# Patient Record
Sex: FEMALE | Race: WHITE | NOT HISPANIC OR LATINO | Employment: UNEMPLOYED | URBAN - METROPOLITAN AREA
[De-identification: names, ages, dates, MRNs, and addresses within clinical notes are randomized per-mention and may not be internally consistent; named-entity substitution may affect disease eponyms.]

---

## 2017-08-04 ENCOUNTER — HISTORICAL (OUTPATIENT)
Dept: INTERNAL MEDICINE | Facility: CLINIC | Age: 52
End: 2017-08-04

## 2017-08-15 ENCOUNTER — HISTORICAL (OUTPATIENT)
Dept: GASTROENTEROLOGY | Facility: CLINIC | Age: 52
End: 2017-08-15

## 2017-08-15 LAB
ALBUMIN SERPL-MCNC: 3.2 GM/DL (ref 3.4–5)
ALBUMIN/GLOB SERPL: 1 RATIO (ref 1–2)
ALP SERPL-CCNC: 162 UNIT/L (ref 45–117)
ALT SERPL-CCNC: 29 UNIT/L (ref 12–78)
AST SERPL-CCNC: 23 UNIT/L (ref 15–37)
BILIRUB SERPL-MCNC: 0.3 MG/DL (ref 0.2–1)
BILIRUBIN DIRECT+TOT PNL SERPL-MCNC: 0.1 MG/DL
BILIRUBIN DIRECT+TOT PNL SERPL-MCNC: 0.2 MG/DL
BUN SERPL-MCNC: 18 MG/DL (ref 7–18)
CALCIUM SERPL-MCNC: 9.7 MG/DL (ref 8.5–10.1)
CHLORIDE SERPL-SCNC: 102 MMOL/L (ref 98–107)
CO2 SERPL-SCNC: 26 MMOL/L (ref 21–32)
CREAT SERPL-MCNC: 0.7 MG/DL (ref 0.6–1.3)
ERYTHROCYTE [DISTWIDTH] IN BLOOD BY AUTOMATED COUNT: 12.4 % (ref 11.5–14.5)
GLOBULIN SER-MCNC: 4.9 GM/ML (ref 2.3–3.5)
GLUCOSE SERPL-MCNC: 279 MG/DL (ref 74–106)
HAV IGM SERPL QL IA: NONREACTIVE
HBV CORE IGM SERPL QL IA: NONREACTIVE
HBV SURFACE AG SERPL QL IA: NEGATIVE
HCT VFR BLD AUTO: 38.7 % (ref 35–46)
HCV AB SERPL QL IA: NONREACTIVE
HGB BLD-MCNC: 12.6 GM/DL (ref 12–16)
HIV 1+2 AB+HIV1 P24 AG SERPL QL IA: NONREACTIVE
INR PPP: 1.03 (ref 0.9–1.2)
MCH RBC QN AUTO: 27.5 PG (ref 26–34)
MCHC RBC AUTO-ENTMCNC: 32.6 GM/DL (ref 31–37)
MCV RBC AUTO: 84.3 FL (ref 80–100)
PLATELET # BLD AUTO: 162 X10(3)/MCL (ref 130–400)
PMV BLD AUTO: 13 FL (ref 7.4–10.4)
POTASSIUM SERPL-SCNC: 4.4 MMOL/L (ref 3.5–5.1)
PROT SERPL-MCNC: 8.1 GM/DL (ref 6.4–8.2)
PROTHROMBIN TIME: 13.3 SECOND(S) (ref 11.9–14.4)
RBC # BLD AUTO: 4.59 X10(6)/MCL (ref 4–5.2)
SODIUM SERPL-SCNC: 138 MMOL/L (ref 136–145)
T3FREE SERPL-MCNC: 10.28 PG/ML (ref 2.18–3.98)
T4 FREE SERPL-MCNC: 3.94 NG/DL (ref 0.76–1.46)
TSH SERPL-ACNC: <0.005 MIU/L (ref 0.36–3.74)
WBC # SPEC AUTO: 8.5 X10(3)/MCL (ref 4.5–11)

## 2017-10-09 ENCOUNTER — HISTORICAL (OUTPATIENT)
Dept: INTERNAL MEDICINE | Facility: CLINIC | Age: 52
End: 2017-10-09

## 2017-10-09 LAB
APPEARANCE, UA: CLEAR
BACTERIA #/AREA URNS AUTO: ABNORMAL /[HPF]
BILIRUB UR QL STRIP: NEGATIVE
CHOLEST SERPL-MCNC: 196 MG/DL
CHOLEST/HDLC SERPL: 3.3 {RATIO} (ref 0–4.4)
COLOR UR: YELLOW
CREAT UR-MCNC: 176 MG/DL
DEPRECATED CALCIDIOL+CALCIFEROL SERPL-MC: 28.49 NG/ML (ref 30–80)
EST. AVERAGE GLUCOSE BLD GHB EST-MCNC: 212 MG/DL
GLUCOSE (UA): 50 MG/DL
HBA1C MFR BLD: 9 % (ref 4.2–6.3)
HDLC SERPL-MCNC: 60 MG/DL
HGB UR QL STRIP: 0.03 MG/DL
HYALINE CASTS #/AREA URNS LPF: ABNORMAL /[LPF]
KETONES UR QL STRIP: NEGATIVE
LDLC SERPL CALC-MCNC: 102 MG/DL (ref 0–130)
LEUKOCYTE ESTERASE UR QL STRIP: NEGATIVE
NITRITE UR QL STRIP: NEGATIVE
PH UR STRIP: 5.5 [PH] (ref 4.5–8)
PROT UR QL STRIP: 20 MG/DL
PROT UR STRIP-MCNC: 33.3 MG/DL
PROT/CREAT UR-RTO: 189.2 MG/GM
RBC #/AREA URNS AUTO: ABNORMAL /[HPF]
SP GR UR STRIP: 1.03 (ref 1–1.03)
SQUAMOUS #/AREA URNS LPF: ABNORMAL /[LPF]
TRIGL SERPL-MCNC: 168 MG/DL
UROBILINOGEN UR STRIP-ACNC: NORMAL
VLDLC SERPL CALC-MCNC: 34 MG/DL
WBC #/AREA URNS AUTO: ABNORMAL /HPF

## 2017-11-06 ENCOUNTER — HISTORICAL (OUTPATIENT)
Dept: INTERNAL MEDICINE | Facility: CLINIC | Age: 52
End: 2017-11-06

## 2017-11-06 LAB
ABS NEUT (OLG): 5.17 X10(3)/MCL (ref 2.1–9.2)
ALBUMIN SERPL-MCNC: 3.7 GM/DL (ref 3.4–5)
ALBUMIN/GLOB SERPL: 1 RATIO (ref 1–2)
ALP SERPL-CCNC: 117 UNIT/L (ref 45–117)
ALT SERPL-CCNC: 35 UNIT/L (ref 12–78)
AST SERPL-CCNC: 21 UNIT/L (ref 15–37)
BASOPHILS # BLD AUTO: 0.04 X10(3)/MCL
BASOPHILS NFR BLD AUTO: 0 % (ref 0–1)
BILIRUB SERPL-MCNC: 0.4 MG/DL (ref 0.2–1)
BILIRUBIN DIRECT+TOT PNL SERPL-MCNC: 0.1 MG/DL
BILIRUBIN DIRECT+TOT PNL SERPL-MCNC: 0.3 MG/DL
BUN SERPL-MCNC: 18 MG/DL (ref 7–18)
CALCIUM SERPL-MCNC: 9.3 MG/DL (ref 8.5–10.1)
CHLORIDE SERPL-SCNC: 105 MMOL/L (ref 98–107)
CO2 SERPL-SCNC: 29 MMOL/L (ref 21–32)
CREAT SERPL-MCNC: 0.8 MG/DL (ref 0.6–1.3)
EOSINOPHIL # BLD AUTO: 0.31 X10(3)/MCL
EOSINOPHIL NFR BLD AUTO: 4 % (ref 0–5)
ERYTHROCYTE [DISTWIDTH] IN BLOOD BY AUTOMATED COUNT: 15 % (ref 11.5–14.5)
EST. AVERAGE GLUCOSE BLD GHB EST-MCNC: 166 MG/DL
GLOBULIN SER-MCNC: 4.3 GM/ML (ref 2.3–3.5)
GLUCOSE SERPL-MCNC: 112 MG/DL (ref 74–106)
HBA1C MFR BLD: 7.4 % (ref 4.2–6.3)
HCT VFR BLD AUTO: 44 % (ref 35–46)
HGB BLD-MCNC: 14.4 GM/DL (ref 12–16)
IMM GRANULOCYTES # BLD AUTO: 0.03 10*3/UL
IMM GRANULOCYTES NFR BLD AUTO: 0 %
LYMPHOCYTES # BLD AUTO: 2.22 X10(3)/MCL
LYMPHOCYTES NFR BLD AUTO: 27 % (ref 15–40)
MCH RBC QN AUTO: 29 PG (ref 26–34)
MCHC RBC AUTO-ENTMCNC: 32.7 GM/DL (ref 31–37)
MCV RBC AUTO: 88.5 FL (ref 80–100)
MONOCYTES # BLD AUTO: 0.39 X10(3)/MCL
MONOCYTES NFR BLD AUTO: 5 % (ref 4–12)
NEUTROPHILS # BLD AUTO: 5.17 X10(3)/MCL
NEUTROPHILS NFR BLD AUTO: 63 X10(3)/MCL
PLATELET # BLD AUTO: 150 X10(3)/MCL (ref 130–400)
PMV BLD AUTO: 10.2 FL (ref 7.4–10.4)
POTASSIUM SERPL-SCNC: 5 MMOL/L (ref 3.5–5.1)
PROT SERPL-MCNC: 8 GM/DL (ref 6.4–8.2)
RBC # BLD AUTO: 4.97 X10(6)/MCL (ref 4–5.2)
SODIUM SERPL-SCNC: 139 MMOL/L (ref 136–145)
WBC # SPEC AUTO: 8.2 X10(3)/MCL (ref 4.5–11)

## 2018-05-30 ENCOUNTER — HISTORICAL (OUTPATIENT)
Dept: ADMINISTRATIVE | Facility: HOSPITAL | Age: 53
End: 2018-05-30

## 2018-05-30 LAB
ABS NEUT (OLG): 6.89 X10(3)/MCL (ref 2.1–9.2)
ALBUMIN SERPL-MCNC: 3.9 GM/DL (ref 3.4–5)
ALBUMIN/GLOB SERPL: 1 RATIO (ref 1–2)
ALP SERPL-CCNC: 94 UNIT/L (ref 45–117)
ALT SERPL-CCNC: 26 UNIT/L (ref 12–78)
AST SERPL-CCNC: 18 UNIT/L (ref 15–37)
BASOPHILS # BLD AUTO: 0.06 X10(3)/MCL
BASOPHILS NFR BLD AUTO: 1 %
BILIRUB SERPL-MCNC: 0.6 MG/DL (ref 0.2–1)
BILIRUBIN DIRECT+TOT PNL SERPL-MCNC: 0.2 MG/DL
BILIRUBIN DIRECT+TOT PNL SERPL-MCNC: 0.4 MG/DL
BUN SERPL-MCNC: 13 MG/DL (ref 7–18)
CALCIUM SERPL-MCNC: 9.2 MG/DL (ref 8.5–10.1)
CHLORIDE SERPL-SCNC: 109 MMOL/L (ref 98–107)
CO2 SERPL-SCNC: 24 MMOL/L (ref 21–32)
CREAT SERPL-MCNC: 0.8 MG/DL (ref 0.6–1.3)
EOSINOPHIL # BLD AUTO: 0.28 10*3/UL
EOSINOPHIL NFR BLD AUTO: 3 %
ERYTHROCYTE [DISTWIDTH] IN BLOOD BY AUTOMATED COUNT: 13.1 % (ref 11.5–14.5)
EST. AVERAGE GLUCOSE BLD GHB EST-MCNC: 174 MG/DL
GLOBULIN SER-MCNC: 4.1 GM/ML (ref 2.3–3.5)
GLUCOSE SERPL-MCNC: 109 MG/DL (ref 74–106)
HBA1C MFR BLD: 7.7 % (ref 4.2–6.3)
HCT VFR BLD AUTO: 44.8 % (ref 35–46)
HGB BLD-MCNC: 14.8 GM/DL (ref 12–16)
IMM GRANULOCYTES # BLD AUTO: 0.02 10*3/UL
IMM GRANULOCYTES NFR BLD AUTO: 0 %
LYMPHOCYTES # BLD AUTO: 2.72 X10(3)/MCL
LYMPHOCYTES NFR BLD AUTO: 26 % (ref 13–40)
MCH RBC QN AUTO: 30.5 PG (ref 26–34)
MCHC RBC AUTO-ENTMCNC: 33 GM/DL (ref 31–37)
MCV RBC AUTO: 92.4 FL (ref 80–100)
MONOCYTES # BLD AUTO: 0.46 X10(3)/MCL
MONOCYTES NFR BLD AUTO: 4 % (ref 4–12)
NEUTROPHILS # BLD AUTO: 6.89 X10(3)/MCL
NEUTROPHILS NFR BLD AUTO: 66 X10(3)/MCL
PLATELET # BLD AUTO: 230 X10(3)/MCL (ref 130–400)
PMV BLD AUTO: 10.3 FL (ref 7.4–10.4)
POTASSIUM SERPL-SCNC: 4 MMOL/L (ref 3.5–5.1)
PROT SERPL-MCNC: 8 GM/DL (ref 6.4–8.2)
RBC # BLD AUTO: 4.85 X10(6)/MCL (ref 4–5.2)
SODIUM SERPL-SCNC: 142 MMOL/L (ref 136–145)
T3FREE SERPL-MCNC: 3.06 PG/ML (ref 2.18–3.98)
T4 FREE SERPL-MCNC: 1.07 NG/DL (ref 0.76–1.46)
TSH SERPL-ACNC: 1.11 MIU/L (ref 0.36–3.74)
WBC # SPEC AUTO: 10.4 X10(3)/MCL (ref 4.5–11)

## 2019-01-22 ENCOUNTER — HISTORICAL (OUTPATIENT)
Dept: GASTROENTEROLOGY | Facility: CLINIC | Age: 54
End: 2019-01-22

## 2019-01-22 LAB
ABS NEUT (OLG): 4.75 X10(3)/MCL (ref 2.1–9.2)
ALBUMIN SERPL-MCNC: 3.7 GM/DL (ref 3.4–5)
ALBUMIN/GLOB SERPL: 0.86 RATIO (ref 1.1–2)
ALP SERPL-CCNC: 79 UNIT/L (ref 45–117)
ALT SERPL-CCNC: 25 UNIT/L (ref 12–78)
AST SERPL-CCNC: 14 UNIT/L (ref 15–37)
BASOPHILS # BLD AUTO: 0.06 X10(3)/MCL
BASOPHILS NFR BLD AUTO: 1 %
BILIRUB SERPL-MCNC: 0.5 MG/DL (ref 0.2–1)
BILIRUBIN DIRECT+TOT PNL SERPL-MCNC: 0.1 MG/DL
BILIRUBIN DIRECT+TOT PNL SERPL-MCNC: 0.4 MG/DL
BUN SERPL-MCNC: 16 MG/DL (ref 7–18)
CALCIUM SERPL-MCNC: 8.5 MG/DL (ref 8.5–10.1)
CHLORIDE SERPL-SCNC: 105 MMOL/L (ref 98–107)
CO2 SERPL-SCNC: 25 MMOL/L (ref 21–32)
CREAT SERPL-MCNC: 0.8 MG/DL (ref 0.6–1.3)
CRP SERPL-MCNC: 1 MG/DL
EOSINOPHIL # BLD AUTO: 0.15 10*3/UL
EOSINOPHIL NFR BLD AUTO: 2 %
ERYTHROCYTE [DISTWIDTH] IN BLOOD BY AUTOMATED COUNT: 12.2 % (ref 11.5–14.5)
FERRITIN SERPL-MCNC: 33.1 NG/ML (ref 8–388)
FOLATE SERPL-MCNC: 18.4 NG/ML (ref 3.1–17.5)
GLOBULIN SER-MCNC: 4.3 GM/ML (ref 2.3–3.5)
GLUCOSE SERPL-MCNC: 132 MG/DL (ref 74–106)
HAV AB SER QL IA: NONREACTIVE
HBV SURFACE AG SERPL QL IA: NEGATIVE
HCT VFR BLD AUTO: 41.2 % (ref 35–46)
HGB BLD-MCNC: 13.4 GM/DL (ref 12–16)
IMM GRANULOCYTES # BLD AUTO: 0.02 10*3/UL
IMM GRANULOCYTES NFR BLD AUTO: 0 %
IRON SATN MFR SERPL: 13.3 % (ref 15–50)
IRON SERPL-MCNC: 58 MCG/DL (ref 50–170)
LYMPHOCYTES # BLD AUTO: 2.28 X10(3)/MCL
LYMPHOCYTES NFR BLD AUTO: 30 % (ref 13–40)
MCH RBC QN AUTO: 29.8 PG (ref 26–34)
MCHC RBC AUTO-ENTMCNC: 32.5 GM/DL (ref 31–37)
MCV RBC AUTO: 91.8 FL (ref 80–100)
MONOCYTES # BLD AUTO: 0.32 X10(3)/MCL
MONOCYTES NFR BLD AUTO: 4 % (ref 4–12)
NEUTROPHILS # BLD AUTO: 4.75 X10(3)/MCL
NEUTROPHILS NFR BLD AUTO: 63 X10(3)/MCL
PLATELET # BLD AUTO: 202 X10(3)/MCL (ref 130–400)
PMV BLD AUTO: 10.2 FL (ref 7.4–10.4)
POTASSIUM SERPL-SCNC: 3.8 MMOL/L (ref 3.5–5.1)
PROT SERPL-MCNC: 8 GM/DL (ref 6.4–8.2)
RBC # BLD AUTO: 4.49 X10(6)/MCL (ref 4–5.2)
SODIUM SERPL-SCNC: 137 MMOL/L (ref 136–145)
TIBC SERPL-MCNC: 435 MCG/DL (ref 250–450)
TRANSFERRIN SERPL-MCNC: 350 MG/DL (ref 200–360)
VIT B12 SERPL-MCNC: 392 PG/ML (ref 193–986)
WBC # SPEC AUTO: 7.6 X10(3)/MCL (ref 4.5–11)

## 2019-02-13 ENCOUNTER — HISTORICAL (OUTPATIENT)
Dept: RADIOLOGY | Facility: HOSPITAL | Age: 54
End: 2019-02-13

## 2019-02-18 ENCOUNTER — HISTORICAL (OUTPATIENT)
Dept: GASTROENTEROLOGY | Facility: CLINIC | Age: 54
End: 2019-02-18

## 2019-02-20 ENCOUNTER — HISTORICAL (OUTPATIENT)
Dept: ENDOSCOPY | Facility: HOSPITAL | Age: 54
End: 2019-02-20

## 2019-09-24 ENCOUNTER — HISTORICAL (OUTPATIENT)
Dept: RADIOLOGY | Facility: HOSPITAL | Age: 54
End: 2019-09-24

## 2019-10-29 ENCOUNTER — HISTORICAL (OUTPATIENT)
Dept: ADMINISTRATIVE | Facility: HOSPITAL | Age: 54
End: 2019-10-29

## 2019-10-29 LAB
ABS NEUT (OLG): 6.58 X10(3)/MCL (ref 2.1–9.2)
ALBUMIN SERPL-MCNC: 3.7 GM/DL (ref 3.4–5)
ALBUMIN/GLOB SERPL: 0.9 RATIO (ref 1.1–2)
ALP SERPL-CCNC: 75 UNIT/L (ref 45–117)
ALT SERPL-CCNC: 26 UNIT/L (ref 12–78)
AST SERPL-CCNC: 18 UNIT/L (ref 15–37)
BASOPHILS # BLD AUTO: 0 X10(3)/MCL (ref 0–0.2)
BASOPHILS NFR BLD AUTO: 0 %
BILIRUB SERPL-MCNC: 0.5 MG/DL (ref 0.2–1)
BILIRUBIN DIRECT+TOT PNL SERPL-MCNC: 0.1 MG/DL (ref 0–0.2)
BILIRUBIN DIRECT+TOT PNL SERPL-MCNC: 0.4 MG/DL
BUN SERPL-MCNC: 21 MG/DL (ref 7–18)
CALCIUM SERPL-MCNC: 8.8 MG/DL (ref 8.5–10.1)
CHLORIDE SERPL-SCNC: 108 MMOL/L (ref 98–107)
CHOLEST SERPL-MCNC: 233 MG/DL
CHOLEST/HDLC SERPL: 4.6 {RATIO} (ref 0–4.4)
CO2 SERPL-SCNC: 24 MMOL/L (ref 21–32)
CREAT SERPL-MCNC: 0.8 MG/DL (ref 0.6–1.3)
CREAT UR-MCNC: 228 MG/DL
CRP SERPL-MCNC: 1.3 MG/DL
DEPRECATED CALCIDIOL+CALCIFEROL SERPL-MC: 17.92 NG/ML (ref 30–80)
EOSINOPHIL # BLD AUTO: 0.3 X10(3)/MCL (ref 0–0.9)
EOSINOPHIL NFR BLD AUTO: 3 %
ERYTHROCYTE [DISTWIDTH] IN BLOOD BY AUTOMATED COUNT: 13.5 % (ref 11.5–14.5)
EST. AVERAGE GLUCOSE BLD GHB EST-MCNC: 174 MG/DL
FERRITIN SERPL-MCNC: 20.8 NG/ML (ref 8–388)
GLOBULIN SER-MCNC: 4 GM/ML (ref 2.3–3.5)
GLUCOSE SERPL-MCNC: 148 MG/DL (ref 74–106)
HBA1C MFR BLD: 7.7 % (ref 4.2–6.3)
HCT VFR BLD AUTO: 44.5 % (ref 35–46)
HDLC SERPL-MCNC: 51 MG/DL (ref 40–59)
HGB BLD-MCNC: 14.2 GM/DL (ref 12–16)
IMM GRANULOCYTES # BLD AUTO: 0.05 10*3/UL
IMM GRANULOCYTES NFR BLD AUTO: 0 %
LDLC SERPL CALC-MCNC: 111 MG/DL
LYMPHOCYTES # BLD AUTO: 2.4 X10(3)/MCL (ref 0.6–4.6)
LYMPHOCYTES NFR BLD AUTO: 24 %
MCH RBC QN AUTO: 30.5 PG (ref 26–34)
MCHC RBC AUTO-ENTMCNC: 31.9 GM/DL (ref 31–37)
MCV RBC AUTO: 95.5 FL (ref 80–100)
MONOCYTES # BLD AUTO: 0.4 X10(3)/MCL (ref 0.1–1.3)
MONOCYTES NFR BLD AUTO: 4 %
NEG CONT SPOT COUNT: NORMAL
NEUTROPHILS # BLD AUTO: 6.58 X10(3)/MCL (ref 2.1–9.2)
NEUTROPHILS NFR BLD AUTO: 67 %
PANEL A SPOT COUNT: 0
PANEL B SPOT COUNT: 0
PLATELET # BLD AUTO: 264 X10(3)/MCL (ref 130–400)
PMV BLD AUTO: 9.9 FL (ref 7.4–10.4)
POS CONT SPOT COUNT: NORMAL
POTASSIUM SERPL-SCNC: 4.1 MMOL/L (ref 3.5–5.1)
PROT SERPL-MCNC: 7.7 GM/DL (ref 6.4–8.2)
PROT UR STRIP-MCNC: 28.7 MG/DL
PROT/CREAT UR-RTO: 125.9 MG/GM
RBC # BLD AUTO: 4.66 X10(6)/MCL (ref 4–5.2)
SODIUM SERPL-SCNC: 139 MMOL/L (ref 136–145)
T-SPOT.TB: NORMAL
T4 FREE SERPL-MCNC: 0.97 NG/DL (ref 0.76–1.46)
TRIGL SERPL-MCNC: 354 MG/DL
TSH SERPL-ACNC: 1.26 MIU/L (ref 0.36–3.74)
VIT B12 SERPL-MCNC: 378 PG/ML (ref 193–986)
VLDLC SERPL CALC-MCNC: 71 MG/DL
WBC # SPEC AUTO: 9.8 X10(3)/MCL (ref 4.5–11)

## 2021-03-19 ENCOUNTER — HISTORICAL (OUTPATIENT)
Dept: INTERNAL MEDICINE | Facility: CLINIC | Age: 56
End: 2021-03-19

## 2021-03-19 LAB
ABS NEUT (OLG): 9.01 X10(3)/MCL (ref 2.1–9.2)
ALBUMIN SERPL-MCNC: 3.9 GM/DL (ref 3.5–5)
ALBUMIN/GLOB SERPL: 1.1 RATIO (ref 1.1–2)
ALP SERPL-CCNC: 81 UNIT/L (ref 40–150)
ALT SERPL-CCNC: 11 UNIT/L (ref 0–55)
APPEARANCE, UA: CLEAR
AST SERPL-CCNC: 14 UNIT/L (ref 5–34)
BACTERIA #/AREA URNS AUTO: ABNORMAL /HPF
BASOPHILS # BLD AUTO: 0.1 X10(3)/MCL (ref 0–0.2)
BASOPHILS NFR BLD AUTO: 0 %
BILIRUB SERPL-MCNC: 0.5 MG/DL
BILIRUB UR QL STRIP: NEGATIVE
BILIRUBIN DIRECT+TOT PNL SERPL-MCNC: 0.2 MG/DL (ref 0–0.5)
BILIRUBIN DIRECT+TOT PNL SERPL-MCNC: 0.3 MG/DL (ref 0–0.8)
BUN SERPL-MCNC: 17.8 MG/DL (ref 9.8–20.1)
CALCIUM SERPL-MCNC: 9 MG/DL (ref 8.4–10.2)
CHLORIDE SERPL-SCNC: 105 MMOL/L (ref 98–107)
CHOLEST SERPL-MCNC: 194 MG/DL
CHOLEST/HDLC SERPL: 4 {RATIO} (ref 0–5)
CO2 SERPL-SCNC: 25 MMOL/L (ref 22–29)
COLOR UR: YELLOW
CREAT SERPL-MCNC: 0.82 MG/DL (ref 0.55–1.02)
CREAT UR-MCNC: 195.2 MG/DL (ref 45–106)
DEPRECATED CALCIDIOL+CALCIFEROL SERPL-MC: 23.8 NG/ML (ref 30–80)
EOSINOPHIL # BLD AUTO: 0.2 X10(3)/MCL (ref 0–0.9)
EOSINOPHIL NFR BLD AUTO: 2 %
ERYTHROCYTE [DISTWIDTH] IN BLOOD BY AUTOMATED COUNT: 13.6 % (ref 11.5–14.5)
EST. AVERAGE GLUCOSE BLD GHB EST-MCNC: 228.8 MG/DL
GLOBULIN SER-MCNC: 3.5 GM/DL (ref 2.4–3.5)
GLUCOSE (UA): 50 MG/DL
GLUCOSE SERPL-MCNC: 216 MG/DL (ref 74–100)
HBA1C MFR BLD: 9.6 %
HCT VFR BLD AUTO: 41.1 % (ref 35–46)
HDLC SERPL-MCNC: 52 MG/DL (ref 35–60)
HGB BLD-MCNC: 12.7 GM/DL (ref 12–16)
HGB UR QL STRIP: NEGATIVE
HYALINE CASTS #/AREA URNS LPF: ABNORMAL /LPF
IMM GRANULOCYTES # BLD AUTO: 0.04 10*3/UL
IMM GRANULOCYTES NFR BLD AUTO: 0 %
KETONES UR QL STRIP: ABNORMAL
LDLC SERPL CALC-MCNC: 107 MG/DL (ref 50–140)
LEUKOCYTE ESTERASE UR QL STRIP: NEGATIVE
LYMPHOCYTES # BLD AUTO: 2.1 X10(3)/MCL (ref 0.6–4.6)
LYMPHOCYTES NFR BLD AUTO: 18 %
MCH RBC QN AUTO: 28.6 PG (ref 26–34)
MCHC RBC AUTO-ENTMCNC: 30.9 GM/DL (ref 31–37)
MCV RBC AUTO: 92.6 FL (ref 80–100)
MICROALBUMIN UR-MCNC: 12.9 MG/L
MICROALBUMIN/CREAT RATIO PNL UR: 6.6 MG/GM CR (ref 0–30)
MONOCYTES # BLD AUTO: 0.5 X10(3)/MCL (ref 0.1–1.3)
MONOCYTES NFR BLD AUTO: 4 %
NEUTROPHILS # BLD AUTO: 9.01 X10(3)/MCL (ref 2.1–9.2)
NEUTROPHILS NFR BLD AUTO: 76 %
NITRITE UR QL STRIP: NEGATIVE
PH UR STRIP: 5.5 [PH] (ref 4.5–8)
PLATELET # BLD AUTO: 346 X10(3)/MCL (ref 130–400)
PMV BLD AUTO: 9.7 FL (ref 7.4–10.4)
POTASSIUM SERPL-SCNC: 4.8 MMOL/L (ref 3.5–5.1)
PROT SERPL-MCNC: 7.4 GM/DL (ref 6.4–8.3)
PROT UR QL STRIP: 20 MG/DL
RBC # BLD AUTO: 4.44 X10(6)/MCL (ref 4–5.2)
RBC #/AREA URNS AUTO: ABNORMAL /HPF
SODIUM SERPL-SCNC: 139 MMOL/L (ref 136–145)
SP GR UR STRIP: 1.03 (ref 1–1.03)
SQUAMOUS #/AREA URNS LPF: >100 /LPF
T4 FREE SERPL-MCNC: 0.96 NG/DL (ref 0.7–1.48)
TRIGL SERPL-MCNC: 174 MG/DL (ref 37–140)
TSH SERPL-ACNC: 1.49 UIU/ML (ref 0.35–4.94)
UROBILINOGEN UR STRIP-ACNC: 2 MG/DL
VLDLC SERPL CALC-MCNC: 35 MG/DL
WBC # SPEC AUTO: 11.9 X10(3)/MCL (ref 4.5–11)
WBC #/AREA URNS AUTO: ABNORMAL /HPF

## 2021-08-07 ENCOUNTER — HISTORICAL (OUTPATIENT)
Dept: ADMINISTRATIVE | Facility: HOSPITAL | Age: 56
End: 2021-08-07

## 2021-08-07 LAB — SARS-COV-2 RNA RESP QL NAA+PROBE: NOT DETECTED

## 2021-10-27 ENCOUNTER — HISTORICAL (OUTPATIENT)
Dept: GASTROENTEROLOGY | Facility: CLINIC | Age: 56
End: 2021-10-27

## 2021-10-27 LAB — SARS-COV-2 AG RESP QL IA.RAPID: NEGATIVE

## 2021-10-29 ENCOUNTER — HISTORICAL (OUTPATIENT)
Dept: ENDOSCOPY | Facility: HOSPITAL | Age: 56
End: 2021-10-29

## 2021-11-22 ENCOUNTER — HISTORICAL (OUTPATIENT)
Dept: ADMINISTRATIVE | Facility: HOSPITAL | Age: 56
End: 2021-11-22

## 2021-11-22 LAB
ABS NEUT (OLG): 9.56 X10(3)/MCL (ref 2.1–9.2)
ALBUMIN SERPL-MCNC: 4 GM/DL (ref 3.5–5)
ALBUMIN/GLOB SERPL: 1.1 RATIO (ref 1.1–2)
ALP SERPL-CCNC: 65 UNIT/L (ref 40–150)
ALT SERPL-CCNC: 17 UNIT/L (ref 0–55)
AST SERPL-CCNC: 18 UNIT/L (ref 5–34)
BASOPHILS # BLD AUTO: 0.1 X10(3)/MCL (ref 0–0.2)
BASOPHILS NFR BLD AUTO: 0 %
BILIRUB SERPL-MCNC: 0.6 MG/DL
BILIRUBIN DIRECT+TOT PNL SERPL-MCNC: 0.2 MG/DL (ref 0–0.5)
BILIRUBIN DIRECT+TOT PNL SERPL-MCNC: 0.4 MG/DL (ref 0–0.8)
BUN SERPL-MCNC: 18.6 MG/DL (ref 9.8–20.1)
CALCIUM SERPL-MCNC: 9.4 MG/DL (ref 8.7–10.5)
CHLORIDE SERPL-SCNC: 108 MMOL/L (ref 98–107)
CO2 SERPL-SCNC: 22 MMOL/L (ref 22–29)
CREAT SERPL-MCNC: 0.9 MG/DL (ref 0.55–1.02)
EOSINOPHIL # BLD AUTO: 0.2 X10(3)/MCL (ref 0–0.9)
EOSINOPHIL NFR BLD AUTO: 2 %
ERYTHROCYTE [DISTWIDTH] IN BLOOD BY AUTOMATED COUNT: 13.3 % (ref 11.5–14.5)
EST. AVERAGE GLUCOSE BLD GHB EST-MCNC: 188.6 MG/DL
GLOBULIN SER-MCNC: 3.5 GM/DL (ref 2.4–3.5)
GLUCOSE SERPL-MCNC: 191 MG/DL (ref 74–100)
HBA1C MFR BLD: 8.2 %
HCT VFR BLD AUTO: 43.6 % (ref 35–46)
HGB BLD-MCNC: 14 GM/DL (ref 12–16)
IMM GRANULOCYTES # BLD AUTO: 0.05 10*3/UL
IMM GRANULOCYTES NFR BLD AUTO: 0 %
LYMPHOCYTES # BLD AUTO: 2.7 X10(3)/MCL (ref 0.6–4.6)
LYMPHOCYTES NFR BLD AUTO: 21 %
MCH RBC QN AUTO: 29.8 PG (ref 26–34)
MCHC RBC AUTO-ENTMCNC: 32.1 GM/DL (ref 31–37)
MCV RBC AUTO: 92.8 FL (ref 80–100)
MONOCYTES # BLD AUTO: 0.6 X10(3)/MCL (ref 0.1–1.3)
MONOCYTES NFR BLD AUTO: 4 %
NEUTROPHILS # BLD AUTO: 9.56 X10(3)/MCL (ref 2.1–9.2)
NEUTROPHILS NFR BLD AUTO: 72 %
NRBC BLD AUTO-RTO: 0 % (ref 0–0.2)
PLATELET # BLD AUTO: 312 X10(3)/MCL (ref 130–400)
PMV BLD AUTO: 10.5 FL (ref 7.4–10.4)
POTASSIUM SERPL-SCNC: 4.3 MMOL/L (ref 3.5–5.1)
PROT SERPL-MCNC: 7.5 GM/DL (ref 6.4–8.3)
RBC # BLD AUTO: 4.7 X10(6)/MCL (ref 4–5.2)
SODIUM SERPL-SCNC: 139 MMOL/L (ref 136–145)
VIT B12 SERPL-MCNC: 375 PG/ML (ref 213–816)
WBC # SPEC AUTO: 13.2 X10(3)/MCL (ref 4.5–11)

## 2022-04-12 ENCOUNTER — HISTORICAL (OUTPATIENT)
Dept: ADMINISTRATIVE | Facility: HOSPITAL | Age: 57
End: 2022-04-12

## 2022-04-30 VITALS
HEIGHT: 64 IN | OXYGEN SATURATION: 100 % | BODY MASS INDEX: 29.4 KG/M2 | SYSTOLIC BLOOD PRESSURE: 130 MMHG | WEIGHT: 172.19 LBS | DIASTOLIC BLOOD PRESSURE: 84 MMHG

## 2022-05-05 DIAGNOSIS — K50.90 CROHN'S DISEASE WITHOUT COMPLICATION, UNSPECIFIED GASTROINTESTINAL TRACT LOCATION: Primary | ICD-10-CM

## 2022-05-05 NOTE — TELEPHONE ENCOUNTER
----- Message from Caridad Buchanan sent at 5/5/2022  2:40 PM CDT -----  Pt states that she is still waiting on PA so that she can get Humira from pharmacy. Please contact pt [phone]

## 2022-05-05 NOTE — HISTORICAL [facility] CERNER
This is a historical note converted from Amy. Formatting and pictures may have been removed.  Please reference [facility] for original formatting and attached multimedia. History of Present Illness  Ms. 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  ?   She was diagnosed with Crohns disease around 1996 when she presented with nausea, vomiting, abdominal pain. ?She had a surgical procedure to remove an infection. ?She recalls being placed on steroids and at one point Asacol without significant improvement. ?She also remembers being briefly on Remicade at one point. ?She moved to [place] and was seen a gastroenterologist there. ?I do not have any records of what transpired during her time in [place]. ?She moved back went several years without seeing a gastroenterologist due to insurance reasons.  ?  In November 2014 she was seen in GI clinic here. ?Prior to that she had presented to the emergency department several times with abdominal pain. ?CT scan in 2014 described severe mural thickening and mesenteric fat stranding in the right lower quadrant involving the terminal ileum, cecum, descending colon and to a lesser degree the transverse colon. ?There was evidence of a colovesicular fistula, enteroenteric fistula between the sigmoid and cecum and between the ileum and cecum. ?There is also evidence of an enterocutaneous fistula. ?She was initially given steroids and sulfasalazine. ?On January 19, 2015, she had a colonoscopy that showed an inflammatory mass around the area of the ileocecal valve and cecum, possible fistula in the right side of the colon, normal ascending, transverse and descending colon, sigmoid diverticulosis along with small polyps not resected in the sigmoid. ?Pathology report of the right colon mass showed severe chronic inflammation and biopsies of the sigmoid colon polyp showed mild  chronic inflammation.  ?  She was started on Humira 40 mg every other week for maintenance. ?Overall continued to suffer from drainage from enterocutaneous fistulas. ?On October 14, 2016, she underwent an expiratory laparotomy, small bowel resection with ileocecectomy, ileocolonic anastomosis, and takedown of an enterosigmoid fistula with primary repair of sigmoid defect. ?Pathology from this resection showed severely adherent intestinal severe transmural inflammation, fibrosis, and fistulas along with chronic inflammation, mucosal ulcerations and fibrosis consistent with Crohns disease.  ?  Following resection she was started back on Humira 40 mg every other week.? Shes had chronic diarrhea?since resection.?  ?   February 2019 colonoscopy showed Rutgeerts i2a disease with ulcerations on IC anastomosis and one aphthous ulcer in the neoti.?  Feb 2019: fecal calprotectin: <16  Feb 2019 ADA trough level: 1.9, Ab < 25  ?   Humira was increased to 40 mg weekly and mtx/folic acid added.?I recommend cholestyramine for diarrhea but she never tried this. She did not remain compliant with methotrexate.  ?   October 2019: ADA trough: 10, Ab < 25 on Humira 40mg?weekly.  ?  She reports compliance with her Humira?on a weekly basis. ?She has never taken the methotrexate and folic acid.? She takes Humira every Thursday. ?Her last dose of Humira was 7 days ago. ?She continues to have diarrhea, sometimes up to 20 bowel movements a day she says.? She denies any nocturnal stools. ?She denies any rectal bleeding or melena. ?She denies any significant abdominal pain.? Appetite is good and her weight is stable. ?She continues to smoke.? She never tried cholestyramine for the diarrhea.  ?  She denies a family history of?inflammatory bowel?disease, colon polyps, or colon cancer.? She is a former smoker?but has not smoked for several years. ?She denies any significant alcohol or recreational drugs.  ?  ?   Endoscopy:  January 19, 2015:  Colonoscopy: inflammatory mass around the area of the ileocecal valve and cecum, possible fistula in the right side of the colon, normal ascending, transverse and descending colon, sigmoid diverticulosis along with small polyps not resected in the sigmoid. ?Pathology report of the right colon mass showed severe chronic inflammation and biopsies of the sigmoid colon polyp showed mild chronic inflammation.  ?   February 20, 2019: Colonoscopy: skin tags, mild puckering of the perianal skin.? Evidence of a prior ileocecectomy?with patent anastomosis.? Superficial ulcerations involving the anastomosis only except for one 3?mm ulcer?in the neoterminal ileum. ?The rest of the neoterminal ileum appeared normal. Approximately 15 cm of the neoterminal ileum was intubated.??Biopsies were taken.? Rutgeerts score i2a.? The remaining colon was normal except for scattered diverticuli in the sigmoid colon. ?She did have an area in the sigmoid?with some scarring and 2 pseudopolyps. Gerard terminal ileum biopsy: mild chronic active inflammation. No significant glandular distortion, cryptitis, granulomas, or fibrosis.  ?   IBD History:  IBD disease: Crohns disease  Disease location: Ileocolonic  Disease behavior: stricturing, penetrating  ?  Current therapy:?  Adalimumab 40mg weekly (February 2019 - present, trough 10, Ab < 25)  ?  Prior therapy:  Steroids  Sulfasalazine  Asacol  Remicade  ?  Surgeries:  October 14, 2016: exp laparotomy, small bowel resection with ileocecectomy, ileocolonic anastomosis, and takedown of an enterosigmoid fistula with primary repair of sigmoid defect. ?Pathology from this resection showed severely adherent intestinal severe transmural inflammation, fibrosis, and fistulas along with chronic inflammation, mucosal ulcerations and fibrosis consistent with Crohns disease.  ?  Complications:  penetrating disease as above  ?  Extraintestinal manifestations:  arthritis  ?  Review of Systems  Comprehensive Review of  Systems performed with no exceptions other than as noted in HPI.  ?  Physical Exam  General:?well-developed well-nourished in no acute distress  Eye: PERRLA, EOMI, clear conjunctiva  HENT:? oropharynx without erythema/exudate, oropharynx and nasal mucosal surfaces moist  Neck:? no thyromegaly or lymphadenopathy, trachea midline  Respiratory:?symmetrical chest expansion and respiratory effort, clear to auscultation bilaterally  Cardiovascular:?regular rate and rhythm without murmurs, gallops or rubs  Gastrointestinal:?soft, non-tender, non-distended with normal bowel sounds, without masses to palpation  Integumentary: no rashes or skin lesions present  Neurologic: cranial nerves intact, no asterixis, awake, alert, and oriented  Psych: good insight, appropriate mood, normal affect  ?  Assessment/Plan  Ms. 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   Risks, benefits, and alternatives of the procedure discussed.?  Will proceed with endoscopic procedure as scheduled.  ?   Problem List/Past Medical History  Ongoing  Abdominal pain, generalized(  Confirmed  )  Abdominal wall fistula(  Confirmed  )  CD (Crohns disease)  Diabetes  Hyperlipidemia  Hypertension  Morbid obesity  Tobacco user  Vitamin D deficiency  Historical  Detached retina  Enterocutaneous fistula  Hypothyroid  Procedure/Surgical History  Colonoscopy (None) (10/29/2021)  Drainage of Right Upper Leg Skin, External Approach (07/30/2021)  Incision and drainage of abscess (eg, carbuncle, suppurative hidradenitis, cutaneous or subcutaneous abscess, cyst, furuncle, or paronychia); complicated or multiple (07/30/2021)  Biopsy Gastrointestinal (None) (02/20/2019)  Colonoscopy (None) (02/20/2019)  Colonoscopy, flexible; with biopsy, single or multiple (02/20/2019)  Excision of Ileum, Via Natural or Artificial Opening Endoscopic, Diagnostic  (2019)  Exploration Laparotomy (None) (10/14/2016)  Ileocecectomy (None) (10/14/2016)  Resection of Cecum, Open Approach (10/14/2016)  Resection of Ileum, Open Approach (10/14/2016)  Small Bowel Resection (None) (10/14/2016)  Biopsy Gastrointestional (2015)  Closed [endoscopic] biopsy of large intestine (2015)  Colonoscopy (2015)  Colonoscopy, flexible, proximal to splenic flexure; with biopsy, single or multiple. (2015)  Appendectomy;  Arthroscopy of knee   delivery only;  Cholecystectomy;  Tonsillectomy and adenoidectomy; age 12 or over   Medications  Inpatient  No active inpatient medications  Home  Bactroban 2% Ointment (22.5 gmTube), 1 etelvina, TOP, BID,? ?Not taking  cholestyramine 4 g/5 g oral powder, 1 packet(s), Oral, BID, 3 refills  Coenzyme Q10 100 mg oral capsule, 100 mg= 1 cap(s), Oral, Daily, 3 refills,? ?Not taking  folic acid 1 mg oral tablet, 1 mg= 1 tab(s), Oral, Daily, 11 refills,? ?Not taking  Humira Pen Psoriasis/Uveitis/Adol HidraSuppur Starter Pack 40 mg/0.8 mL subcutaneous kit, 40 mg, Subcutaneous, qWeek,? ?Still taking, not as prescribed: pt stated  lisinopril 10 mg oral tablet, 10 mg= 1 tab(s), Oral, Daily, 11 refills  metFORMIN 1000 mg oral tablet, 1000 mg= 1 tab(s), Oral, BID, 11 refills  MoviPrep oral powder for reconstitution, 240 mL, Oral, Daily  rosuvastatin 5 mg oral tablet, 5 mg= 1 tab(s), Oral, Daily, 3 refills,? ?Not taking  Vitamin D2 2000 intl units (50 mcg) oral capsule, 2000 IntUnit= 1 cap(s), Oral, Daily, 3 refills  Allergies  Latex  codeine?(Itching)  Social History  Abuse/Neglect  No, No, Yes, 10/29/2021  Alcohol  Current, whiskey, 1-2 times per year, 2021  Employment/School  Unemployed, Highest education level: University degree(s)., 2020  Exercise - Does not exercise, 2016  Exercise duration: 20. Exercise frequency: Daily. Exercise type: treadmill., 2021  Home/Environment  Lives with Spouse. Living situation:  Home/Independent. Single family house, 11/30/2020  Nutrition/Health  Regular, Good, 11/30/2020  Spiritual/Cultural  Hoahaoism, 11/30/2020  Substance Use  Never, 03/24/2021  Tobacco  Never (less than 100 in lifetime), No, 10/29/2021  Family History  Cancer: Mother.  Diabetes mellitus type 1.: Father and Sister.  Rheumatoid arthritis: Mother.  Stroke: Father.  Immunizations  Vaccine Date Status   COVID-19 MRNA, LNP-S, PF- Pfizer 09/03/2021 Recorded   COVID-19 MRNA, LNP-S, PF- Pfizer 02/26/2021 Recorded   COVID-19 MRNA, LNP-S, PF- Pfizer 02/05/2021 Recorded   zoster vaccine, inactivated 11/04/2019 Given   pneumococcal 23-polyvalent vaccine 10/29/2019 Given   hepatitis A-hepatitis B vaccine 10/29/2019 Given   pneumococcal 13-valent conjugate vaccine 08/13/2019 Given   hepatitis A-hepatitis B vaccine 01/31/2019 Given   tetanus/diphtheria/pertussis, acel(Tdap) 10/09/2017 Given   tuberculin purified protein derivative 11/10/2014 Given

## 2022-05-05 NOTE — HISTORICAL [facility] CERNER
This is a historical note converted from [facility]. Formatting and pictures may have been removed.  Please reference [facility] for original formatting and attached multimedia. History of Present Illness  Ms. 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  ?   She was diagnosed with Crohns disease around 1996 when she presented with nausea, vomiting, abdominal pain. ?She had a surgical procedure to remove an infection. ?She recalls being placed on steroids and at one point Asacol without significant improvement. ?She also remembers being briefly on Remicade at one point. ?She moved to [place] and was seen a gastroenterologist there. ?I do not have any records of what transpired during her time in [place]. ?She moved back to the last area and one for several years without seeing a gastroenterologist due to insurance reasons.  ?  In November 2014 she was seen in GI clinic here. ?Prior to that she had presented to the emergency department several times with abdominal pain. ?CT scan in 2014 described severe mural thickening and mesenteric fat stranding in the right lower quadrant involving the terminal ileum, cecum, descending colon and to a lesser degree the transverse colon. ?There was evidence of a colovesicular fistula, enteroenteric fistula between the sigmoid and cecum and between the ileum and cecum. ?There is also evidence of an enterocutaneous fistula. ?She was initially given steroids and sulfasalazine. ?On January 19, 2015, she had a colonoscopy that showed an inflammatory mass around the area of the ileocecal valve and cecum, possible fistula in the right side of the colon, normal ascending, transverse and descending colon, sigmoid diverticulosis along with small polyps not resected in the sigmoid. ?Pathology report of the right colon mass showed severe chronic inflammation and biopsies of the  sigmoid colon polyp showed mild chronic inflammation.  ?  She was started on Humira 40 mg every other week for maintenance. ?Overall continued to suffer from drainage from enterocutaneous fistulas. ?On October 14, 2016, she underwent an expiratory laparotomy, small bowel resection with ileocecectomy, ileocolonic anastomosis, and takedown of an enterosigmoid fistula with primary repair of sigmoid defect. ?Pathology from this resection showed severely adherent intestinal severe transmural inflammation, fibrosis, and fistulas along with chronic inflammation, mucosal ulcerations and fibrosis consistent with Crohns disease.  ?  Following resection she was started back on Humira 40 mg every other week.? Presently she reports having?8-10 loose to soft bowel movements a day. ?She denies any rectal bleeding or melena. ?She denies any nocturnal stools. ?She denies any abdominal pain.? Weight has been stable. ?She denies any NSAID use. ?She does complain of hip?problems and pain?but otherwise denies any other joint issues are red painful eyes.? She reports compliance with her 40 mg every other week of Humira.  ?   She denies a family history of?inflammatory bowel?disease, colon polyps, or colon cancer.? She is a former smoker?but has not smoked for several years. ?She denies any significant alcohol or recreational drugs.  ?   She had her adalimumab level checked 2?days ago.? REsults are still pending.? Her last injection was yesterday.  ?  Review of Systems  Constitutional:??no weight loss,?no fatigue,?no fever,?no chills,?no weakness,?  HEENT:???no pain,?no redness,?no blurry or double vision,??no hoarseness,?no thrush,?no non-healing sores.  Cardiovascular:?no chest pain or discomfort,?no tightness,?no palpitations,?no SOB with activity,??no swelling,?  Respiratory:??no cough,?no sputum,?no coughing up blood,?no SOB,?no wheezing,?no painful breathing.  Gastrointestinal:?no swallowing difficulty,?no heartburn,?no change in  appetite,?no nausea,?no change in bowel habits,?no rectal bleeding,?no constipation,?diarrhea,?no yellow eyes or skin.  Musculoskeletal:?no muscle or joint pain,?no stiffness,?no back pain,?no redness of joints,?no swelling of joints,?  Skin:?no rashes,?no lumps,?no itching,?no dryness,?color normal for ethnicity,?  Neurologic:?no dizziness,?no fainting,?no seizures,?no weakness,?no numbness,?no tingling,?  Psychiatric:?no nervousness,?no stress,?no depression,?no memory loss.  Hematologic:?no ease of bruising,?no ease of bleeding.  Physical Exam  Vitals & Measurements  T:?36.6? ?C (Oral)? HR:?70(Peripheral)? RR:?19? BP:?138/81? SpO2:?100%? WT:?82.9?kg?  General:?well-developed well-nourished in no acute distress  Eye: PERRLA, EOMI, clear conjunctiva  HENT:? oropharynx without erythema/exudate, oropharynx and nasal mucosal surfaces moist  Neck:? no thyromegaly or lymphadenopathy  Respiratory:?clear to auscultation bilaterally  Cardiovascular:?regular rate and rhythm without murmurs, gallops or rubs  Gastrointestinal:?soft, non-tender, non-distended with normal bowel sounds, without masses to palpation  Integumentary: no rashes or skin lesions present  Neurologic: cranial nerves intact, no asterixis, awake, alert, and oriented  Psych: good insight, appropriate mood  ?  Assessment/Plan  Ms. Richmond is a 53 year old C female with small bowel fistulizing Crohns disease s/p ileocecectomy in 2016 on Humira 40mg every other week here for a colonoscopy.  ?   Risks, benefits, and alternatives of the procedure discussed.?  Will proceed with endoscopic procedure as scheduled.   Problem List/Past Medical History  Ongoing  Abdominal pain, generalized(  Confirmed  )  Abdominal wall fistula(  Confirmed  )  Crohns disease  Crohns disease, small intestine  Morbid obesity  Historical  CD (Crohns disease)  Detached retina  Enterocutaneous fistula  Tobacco user  Procedure/Surgical History  Exploration Laparotomy (None)  (10/14/2016)  Ileocecectomy (None) (10/14/2016)  Small Bowel Resection (None) (10/14/2016)  Biopsy Gastrointestional (2015)  Colonoscopy (2015)  Appendectomy;  Arthroscopy of knee   delivery only;  Cholecystectomy;  Tonsillectomy and adenoidectomy; age 12 or over   Medications  Inpatient  Lidocaine Viscous 2% mucous membrane solution, 15 mL, N/A, Once  [id number] 1,000 mL, 1000 mL, IV  Home  Humira Pen Crohns/UlcerColitis/Hidradenitis Suppurativa Starter Pack subcutaneous kit, 40 mg= 0.8 mL, Subcutaneous, q2wk  lisinopril 10 mg oral tablet, 10 mg= 1 tab(s), Oral, Daily, 1 refills  metFORMIN 1000 mg oral tablet, 1000 mg= 1 tab(s), Oral, BID, 3 refills  Allergies  Latex  Social History  Alcohol  Never, 2019  Employment/School  Employed, 2018  Exercise - Does not exercise, 2016  Home/Environment  Lives with Spouse. Living situation: Home/Independent. Alcohol abuse in household: No. Substance abuse in household: No. Smoker in household: Yes. Injuries/Abuse/Neglect in household: No. Feels unsafe at home: No. Safe place to go: Yes. Agency(s)/Others notified: No. Family/Friends available for support: Yes. Concern for family members at home: No. Major illness in household: Yes. Financial concerns: No. TV/Computer concerns: No. Risks in environment: Unlocked guns, Pets/Animal exposure., 2015  Nutrition/Health  Diabetic, Wants to lose weight: Yes., 10/09/2017  Sexual  Substance Abuse  Never, 2019  Tobacco  Former smoker, quit more than 30 days ago, Cigarettes, N/A, 2019  Family History  Cancer: Mother.  Diabetes mellitus type 1.: Father and Sister.  Rheumatoid arthritis: Mother.  Stroke: Father.  Immunizations  Vaccine Date Status   hepatitis A-hepatitis B vaccine 2019 Given   tetanus/diphtheria/pertussis, acel(Tdap) 10/09/2017 Given

## 2022-05-08 RX ORDER — ADALIMUMAB 40MG/0.8ML
40 KIT SUBCUTANEOUS
Qty: 4 EACH | Refills: 11 | Status: SHIPPED | OUTPATIENT
Start: 2022-05-08 | End: 2023-07-11 | Stop reason: SDUPTHER

## 2022-09-21 ENCOUNTER — OFFICE VISIT (OUTPATIENT)
Dept: GYNECOLOGY | Facility: CLINIC | Age: 57
End: 2022-09-21

## 2022-09-21 VITALS
RESPIRATION RATE: 19 BRPM | WEIGHT: 169.19 LBS | SYSTOLIC BLOOD PRESSURE: 121 MMHG | OXYGEN SATURATION: 100 % | BODY MASS INDEX: 29.98 KG/M2 | HEIGHT: 63 IN | HEART RATE: 78 BPM | DIASTOLIC BLOOD PRESSURE: 76 MMHG | TEMPERATURE: 98 F

## 2022-09-21 DIAGNOSIS — Z72.0 TOBACCO USE: ICD-10-CM

## 2022-09-21 DIAGNOSIS — Z12.31 VISIT FOR SCREENING MAMMOGRAM: ICD-10-CM

## 2022-09-21 DIAGNOSIS — Z12.4 PAP SMEAR FOR CERVICAL CANCER SCREENING: Primary | ICD-10-CM

## 2022-09-21 PROCEDURE — 99396 PR PREVENTIVE VISIT,EST,40-64: ICD-10-PCS | Mod: S$PBB,,, | Performed by: NURSE PRACTITIONER

## 2022-09-21 PROCEDURE — 99396 PREV VISIT EST AGE 40-64: CPT | Mod: S$PBB,,, | Performed by: NURSE PRACTITIONER

## 2022-09-21 PROCEDURE — 99215 OFFICE O/P EST HI 40 MIN: CPT | Mod: PBBFAC | Performed by: NURSE PRACTITIONER

## 2022-09-21 RX ORDER — METFORMIN HYDROCHLORIDE 1000 MG/1
1000 TABLET ORAL
COMMUNITY
Start: 2022-04-26 | End: 2023-08-23

## 2022-09-21 RX ORDER — LISINOPRIL 10 MG/1
10 TABLET ORAL
COMMUNITY
Start: 2021-03-24 | End: 2023-08-23 | Stop reason: SDUPTHER

## 2022-09-21 RX ORDER — LINAGLIPTIN 5 MG/1
5 TABLET, FILM COATED ORAL
COMMUNITY
Start: 2021-11-22 | End: 2023-08-16

## 2022-09-21 RX ORDER — GLIPIZIDE 5 MG/1
5 TABLET, FILM COATED, EXTENDED RELEASE ORAL
COMMUNITY
Start: 2022-01-11 | End: 2023-08-23 | Stop reason: SDUPTHER

## 2022-09-21 NOTE — PROGRESS NOTES
"  Subjective:       Patient ID: Janel Richmond is a 57 y.o. female.    Chief Complaint:  Well Woman    History of Present Illness  The patient  here for annual exam. Pt states she is postmenopausal since early 50s. Last pap 2018-NIL and HPV neg. MG-2019 & BIRADS 1. Denies breast or urinary complaints. Denies pelvic pain or discharge. Pt reports no STIs in the past and no concerns. Admits tobacco use. Dep. screening 0. Denies fly hx of breast, ovarian, uterine or colon cancer.    GYN & OB History  No LMP recorded. Patient is postmenopausal.     Review of patient's allergies indicates:   Allergen Reactions    Iodine Swelling, Itching and Shortness Of Breath     Other reaction(s): Swelling of throat    Latex Hives, Itching and Swelling    Shellfish containing products Swelling     Other reaction(s): Swelling of throat    Codeine Hives and Itching     Past Medical History:   Diagnosis Date    Crohn's disease     Diabetes mellitus      OB History    Para Term  AB Living   3 2           SAB IAB Ectopic Multiple Live Births                  # Outcome Date GA Lbr Ki/2nd Weight Sex Delivery Anes PTL Lv   3             2 Para            1 Para                 Review of Systems  Review of Systems    Negative except for pertinent findings for positives per HPI     Objective:    Physical Exam    /76 (BP Location: Right arm, Patient Position: Sitting, BP Method: Medium (Automatic))   Pulse 78   Temp 97.5 °F (36.4 °C)   Resp 19   Ht 5' 3" (1.6 m)   Wt 76.7 kg (169 lb 3.2 oz)   SpO2 100%   BMI 29.97 kg/m²   GENERAL: Well-developed female in no acute distress.  SKIN: Normal to inspection, warm and intact.  BREASTS: No masses, lumps, discharge, tenderness.  VULVA: General appearance normal; external genitalia with no lesions or erythema.  VAGINA: Mucosa normal, pale, no discharge or lesions.  CERVIX: Grossly normal, pale, no erythema or discharge.  BIMANUAL EXAM: reveals a 10 week-sized uterus. " The uterus is non tender. Jerardo adnexa reveal no evidence of masses, tenderness.  PSYCHIATRIC: Patient is oriented to person, place, and time. Mood and affect are normal.    Assessment:       1. Pap smear for cervical cancer screening    2. Visit for screening mammogram    3. Tobacco use       Plan:   Janel was seen today for well woman.    Diagnoses and all orders for this visit:    Pap smear for cervical cancer screening  -     Liquid-Based Pap Smear, Screening Screening    Visit for screening mammogram  -     Mammo Digital Screening Bilat; Future    Tobacco use  Pap today  MMG ordered  Smoking cessation counseling provided. Instructed on smoking cessation program through [facility] and pharmacological interventions to aid in cessation.  Follow up in about 1 year (around 9/21/2023) for annual exam.

## 2022-09-29 LAB
INSULIN SERPL-ACNC: NORMAL U[IU]/ML
LAB AP BETHESDA CATEGORY: NORMAL
LAB AP CLINICAL FINDINGS: NORMAL
LAB AP CONTRACEPTIVES: NORMAL
LAB AP GYN MOLECULAR TESTING: NORMAL
LAB AP LMP DATE: NORMAL
LAB AP OCHS PAP SPECIMEN ADEQUACY: NORMAL
LAB AP OHS PAP INTERPRETATION: NORMAL
LAB AP PAP DISCLAIMER COMMENTS: NORMAL
LAB AP PAP ESTROGEN REPLACEMENT THERAPY: NORMAL
LAB AP PAP PMP: NORMAL
LAB AP PAP PREVIOUS BX: NORMAL
LAB AP PAP PRIOR TREATMENT: NORMAL

## 2023-01-13 DIAGNOSIS — I10 ESSENTIAL HYPERTENSION: Primary | ICD-10-CM

## 2023-01-13 DIAGNOSIS — Z00.00 ENCOUNTER FOR WELLNESS EXAMINATION IN ADULT: ICD-10-CM

## 2023-01-13 DIAGNOSIS — Z13.220 SCREENING FOR LIPID DISORDERS: ICD-10-CM

## 2023-01-13 DIAGNOSIS — E11.9 TYPE 2 DIABETES MELLITUS WITHOUT COMPLICATION, WITHOUT LONG-TERM CURRENT USE OF INSULIN: ICD-10-CM

## 2023-01-13 DIAGNOSIS — Z13.21 ENCOUNTER FOR VITAMIN DEFICIENCY SCREENING: ICD-10-CM

## 2023-01-13 DIAGNOSIS — Z11.3 SCREEN FOR STD (SEXUALLY TRANSMITTED DISEASE): ICD-10-CM

## 2023-01-13 DIAGNOSIS — Z13.29 SCREENING FOR THYROID DISORDER: ICD-10-CM

## 2023-04-08 ENCOUNTER — HOSPITAL ENCOUNTER (EMERGENCY)
Facility: HOSPITAL | Age: 58
Discharge: HOME | End: 2023-04-08
Attending: EMERGENCY MEDICINE

## 2023-04-08 VITALS
BODY MASS INDEX: 28.31 KG/M2 | OXYGEN SATURATION: 97 % | SYSTOLIC BLOOD PRESSURE: 123 MMHG | DIASTOLIC BLOOD PRESSURE: 65 MMHG | TEMPERATURE: 98 F | WEIGHT: 165.81 LBS | HEART RATE: 72 BPM | RESPIRATION RATE: 18 BRPM | HEIGHT: 64 IN

## 2023-04-08 DIAGNOSIS — K13.0 ABSCESS OF LIP: Primary | ICD-10-CM

## 2023-04-08 PROCEDURE — 99283 EMERGENCY DEPT VISIT LOW MDM: CPT

## 2023-04-08 PROCEDURE — 25000003 PHARM REV CODE 250: Performed by: PHYSICIAN ASSISTANT

## 2023-04-08 RX ORDER — SULFAMETHOXAZOLE AND TRIMETHOPRIM 800; 160 MG/1; MG/1
1 TABLET ORAL 2 TIMES DAILY
Qty: 20 TABLET | Refills: 0 | Status: SHIPPED | OUTPATIENT
Start: 2023-04-08 | End: 2023-04-18

## 2023-04-08 RX ORDER — SULFAMETHOXAZOLE AND TRIMETHOPRIM 800; 160 MG/1; MG/1
1 TABLET ORAL
Status: COMPLETED | OUTPATIENT
Start: 2023-04-08 | End: 2023-04-08

## 2023-04-08 RX ADMIN — SULFAMETHOXAZOLE AND TRIMETHOPRIM 1 TABLET: 800; 160 TABLET ORAL at 02:04

## 2023-04-08 NOTE — ED PROVIDER NOTES
Encounter Date: 2023       History     Chief Complaint   Patient presents with    Abscess     Abscess to lip, started on Wednesday.  Drainage this am, increase pain.       Patient is a 57 year old female with hx of Crohn's and DM, who presents to the emergency department with complaints of abscess to lower lip that started 4 days ago.  Small amount of drainage noticed this morning with increased pain.  She rates her pain 7/10.  She denies fever, chills, nausea.     The history is provided by the patient. No  was used.   Review of patient's allergies indicates:   Allergen Reactions    Iodine Swelling, Itching and Shortness Of Breath     Other reaction(s): Swelling of throat    Latex Hives, Itching and Swelling    Shellfish containing products Swelling     Other reaction(s): Swelling of throat    Codeine Hives and Itching     Past Medical History:   Diagnosis Date    Crohn's disease     Diabetes mellitus      Past Surgical History:   Procedure Laterality Date     SECTION      CHOLECYSTECTOMY      TONSILLECTOMY, ADENOIDECTOMY       Family History   Problem Relation Age of Onset    Hypertension Father     Diabetes Father     Diabetes Mother     Thyroid cancer Mother     Diabetes Brother     Diabetes Sister      Social History     Tobacco Use    Smoking status: Every Day     Packs/day: 1.00     Types: Cigarettes    Smokeless tobacco: Never   Substance Use Topics    Alcohol use: Not Currently    Drug use: Never     Review of Systems   Constitutional:  Negative for chills and fever.   HENT:  Negative for congestion, facial swelling and sore throat.         Lip swelling   Eyes: Negative.    Respiratory:  Negative for cough, chest tightness and shortness of breath.    Cardiovascular:  Negative for chest pain and palpitations.   Gastrointestinal:  Negative for abdominal pain, diarrhea, nausea and vomiting.   Genitourinary:  Negative for dysuria.   Musculoskeletal:  Negative for back pain.    Skin:  Negative for rash and wound.   Neurological:  Negative for dizziness and numbness.     Physical Exam     Initial Vitals [04/08/23 1229]   BP Pulse Resp Temp SpO2   (!) 150/86 76 18 97.7 °F (36.5 °C) 99 %      MAP       --         Physical Exam    Nursing note and vitals reviewed.  Constitutional: She appears well-developed and well-nourished.   HENT:   Head: Normocephalic and atraumatic.   Nose: Nose normal.   Mouth/Throat: Oropharynx is clear and moist.   Mild swelling to right lower lip with scant drainage of pus   Eyes: Conjunctivae are normal.   Neck: Neck supple.   Normal range of motion.  Cardiovascular:  Normal rate, regular rhythm, normal heart sounds and intact distal pulses.           Pulmonary/Chest: Breath sounds normal.   Abdominal: Abdomen is soft. Bowel sounds are normal. There is no abdominal tenderness. There is no rebound and no guarding.   Musculoskeletal:         General: Normal range of motion.      Cervical back: Normal range of motion and neck supple.     Neurological: She is alert.   Skin: Skin is warm. Capillary refill takes less than 2 seconds.       ED Course   Procedures  Labs Reviewed - No data to display       Imaging Results    None          Medications   sulfamethoxazole-trimethoprim 800-160mg per tablet 1 tablet (1 tablet Oral Given 4/8/23 1426)     Medical Decision Making:   Initial Assessment:   Patient is a 57 year old female with hx of Crohn's and DM, who presents to the emergency department with complaints of abscess to lower lip that started 4 days ago.  Differential Diagnosis:   Herpes, abscess, cellulitis   ED Management:  Right lower lip abscess, mild drainage of pus    Medication given: Bactrim DS    Prescribed: Bactrim DS  The patient is resting comfortably and in no acute distress.   I personally discussed her test results and treatment plan.  Gave strict ED precautions, discussed specific conditions for return to the emergency department and importance of follow  up with her primary care provider.  Patient voices understanding and agrees to the plan discussed.  All patients' questions have been answered at this time.   She has remained hemodynamically stable throughout entire stay in ED and is stable for discharge home.                          Clinical Impression:   Final diagnoses:  [K13.0] Abscess of lip (Primary)        ED Disposition Condition    Discharge Stable          ED Prescriptions       Medication Sig Dispense Start Date End Date Auth. Provider    sulfamethoxazole-trimethoprim 800-160mg (BACTRIM DS) 800-160 mg Tab Take 1 tablet by mouth 2 (two) times daily. for 10 days 20 tablet 4/8/2023 4/18/2023 FRANK Marsh          Follow-up Information       Follow up With Specialties Details Why Contact Info    [facility] - Emergency Dept Emergency Medicine  As needed, If symptoms worsen [address] [facility] [id number] [phone]             FRANK Marsh  04/08/23 2036

## 2023-04-08 NOTE — DISCHARGE INSTRUCTIONS
Take antibiotics as prescribed with food and water until complete.  Follow up with your primary care provider within 2-3 days.

## 2023-04-09 ENCOUNTER — HOSPITAL ENCOUNTER (EMERGENCY)
Facility: HOSPITAL | Age: 58
Discharge: HOME | End: 2023-04-09
Attending: FAMILY MEDICINE

## 2023-04-09 VITALS
DIASTOLIC BLOOD PRESSURE: 82 MMHG | WEIGHT: 164.25 LBS | HEIGHT: 64 IN | RESPIRATION RATE: 18 BRPM | HEART RATE: 71 BPM | SYSTOLIC BLOOD PRESSURE: 145 MMHG | BODY MASS INDEX: 28.04 KG/M2 | OXYGEN SATURATION: 100 % | TEMPERATURE: 98 F

## 2023-04-09 DIAGNOSIS — K13.0 LIP ABSCESS: Primary | ICD-10-CM

## 2023-04-09 PROCEDURE — 25000003 PHARM REV CODE 250: Performed by: PHYSICIAN ASSISTANT

## 2023-04-09 PROCEDURE — 99283 EMERGENCY DEPT VISIT LOW MDM: CPT

## 2023-04-09 RX ORDER — HYDROCODONE BITARTRATE AND ACETAMINOPHEN 5; 325 MG/1; MG/1
1 TABLET ORAL
Status: COMPLETED | OUTPATIENT
Start: 2023-04-09 | End: 2023-04-09

## 2023-04-09 RX ADMIN — HYDROCODONE BITARTRATE AND ACETAMINOPHEN 1 TABLET: 5; 325 TABLET ORAL at 01:04

## 2023-04-09 NOTE — ED PROVIDER NOTES
Encounter Date: 2023       History     Chief Complaint   Patient presents with    Wound Check     C/o lower lip increased swelling and pain since came into ER yesterday. Did not get to fill antibiotics.      58 yo F w/ PMHx significant for DM & crohn's presents to ED c/o worsened swelling & pain to abscess of R lower lip. Patient was seen in this ED yesterday & discharged w/ bactrim. Reports she did not  bactrim rx, but had bactrim at her house that she began taking. Denies F/C, generalized weakness, fatigue, N/V/D, abdominal pain, HA, dizziness, genital sore, dysphagia, stridor, drooling, trismus, voice change. VSS on arrival, patient in NAD.    Review of patient's allergies indicates:   Allergen Reactions    Iodine Swelling, Itching and Shortness Of Breath     Other reaction(s): Swelling of throat    Latex Hives, Itching and Swelling    Shellfish containing products Swelling     Other reaction(s): Swelling of throat    Codeine Hives and Itching     Past Medical History:   Diagnosis Date    Crohn's disease     Diabetes mellitus      Past Surgical History:   Procedure Laterality Date     SECTION      CHOLECYSTECTOMY      TONSILLECTOMY, ADENOIDECTOMY       Family History   Problem Relation Age of Onset    Hypertension Father     Diabetes Father     Diabetes Mother     Thyroid cancer Mother     Diabetes Brother     Diabetes Sister      Social History     Tobacco Use    Smoking status: Every Day     Packs/day: 1.00     Types: Cigarettes    Smokeless tobacco: Never   Substance Use Topics    Alcohol use: Not Currently    Drug use: Never     Review of Systems   All other systems reviewed and are negative.    Physical Exam     Initial Vitals [23 1216]   BP Pulse Resp Temp SpO2   (!) 145/82 71 20 97.9 °F (36.6 °C) 100 %      MAP       --         Physical Exam    Nursing note and vitals reviewed.  Constitutional: She appears well-developed and well-nourished. She is not diaphoretic. No distress.    HENT:   Head: Normocephalic and atraumatic.       Right Ear: Hearing, tympanic membrane, external ear and ear canal normal.   Left Ear: Hearing, tympanic membrane, external ear and ear canal normal.   Nose: Nose normal.   Mouth/Throat: Uvula is midline, oropharynx is clear and moist and mucous membranes are normal. No trismus in the jaw. No uvula swelling. No oropharyngeal exudate, posterior oropharyngeal edema, posterior oropharyngeal erythema or tonsillar abscesses.   Eyes: Conjunctivae and EOM are normal. Pupils are equal, round, and reactive to light.   Neck: Neck supple.   Normal range of motion.  Cardiovascular:  Normal rate, regular rhythm, normal heart sounds and intact distal pulses.     Exam reveals no gallop and no friction rub.       No murmur heard.  Pulmonary/Chest: Breath sounds normal. No respiratory distress. She has no wheezes. She has no rhonchi. She has no rales.   Abdominal: Abdomen is soft. Bowel sounds are normal. She exhibits no distension. There is no abdominal tenderness. There is no rebound and no guarding.   Musculoskeletal:         General: No tenderness or edema. Normal range of motion.      Cervical back: Normal range of motion and neck supple.     Lymphadenopathy:     She has no cervical adenopathy.   Neurological: She is alert and oriented to person, place, and time. She has normal strength.   Skin: Skin is warm and dry. Capillary refill takes less than 2 seconds. No rash noted. No erythema. No pallor.   Psychiatric: She has a normal mood and affect. Thought content normal.       ED Course   Procedures  Labs Reviewed - No data to display       Imaging Results    None          Medications   HYDROcodone-acetaminophen 5-325 mg per tablet 1 tablet (1 tablet Oral Given 4/9/23 1307)     Medical Decision Making:   Physical exam w/ no signs of impending airway compromise. Shared decision making performed w/ patient & she declined I&D at this time for more conservative treatment. Instructed  to continue taking abx as directed & encouraged to begin applying warm compresses for 15 mins at a time QID. Otherwise advised against any manual manipulation of abscess. Discharged w/ magic mouthwash for additional symptomatic relief. Instructed to follow-up w/ PCP tomorrow. ED precautions given for new or worsening symptoms.                        Clinical Impression:   Final diagnoses:  [K13.0] Lip abscess (Primary)        ED Disposition Condition    Discharge Good          ED Prescriptions       Medication Sig Dispense Start Date End Date Auth. Provider    (Magic mouthwash) 1:1:1 diphenhydrAMINE(Benadryl) 12.5mg/5ml liq, aluminum & magnesium hydroxide-simethicone (Maalox), LIDOcaine viscous 2% Swish and spit 5 mLs every 4 (four) hours as needed (lip pain). for mouth sores 90 mL 4/9/2023 -- FRANK Almanzar          Follow-up Information    None          FRANK Almanzar  04/09/23 9961

## 2023-04-27 ENCOUNTER — TELEPHONE (OUTPATIENT)
Dept: GASTROENTEROLOGY | Facility: CLINIC | Age: 58
End: 2023-04-27

## 2023-04-27 DIAGNOSIS — K50.018 CROHN'S DISEASE OF SMALL INTESTINE WITH OTHER COMPLICATION: Primary | ICD-10-CM

## 2023-04-27 DIAGNOSIS — R19.7 DIARRHEA, UNSPECIFIED TYPE: ICD-10-CM

## 2023-04-27 NOTE — TELEPHONE ENCOUNTER
----- Message from Essence Marte sent at 4/26/2023  1:15 PM CDT -----  Regarding: Please call pt  Pt called and stated she is having 26 bm's a day. Pt stated everything she eats goes straight thru. Please call pt @ [phone]        Thank You  Carmen SOOD

## 2023-04-27 NOTE — TELEPHONE ENCOUNTER
Spoke to PT. She stated that she is not in a flare. She denies abd pain, rectal bleeding, mucus stools, etc.  Her only complaint is the amount of BM's she is having on occasion.  Stated this is not every day.  Yesterday she decided to count how many times because she was curious to know.  She is having accidents especially after eating meals or having to travel.      She had 23 BM's day before yesterday.  She is not passing a lot every time she goes but it is loose.  Depending on what she eats the smell is different.  She just finished a round of abx for a staph infection on her lip.  She did notice that stools have been more frequent since on abx.      She is still taking her Humira weekly.  She does not take the Questran anymore and tried to take the imodium when leaving her home.  States it helps for a couple of days then doesn't.    Advised PT that we will need blood work and stool studies completed.  She will need an apt with Dr. WALSH once she returns or possible colonoscopy given lab findings. PT will have this completed on Wednesday next week so we can draw a trough as well.

## 2023-05-04 ENCOUNTER — TELEPHONE (OUTPATIENT)
Dept: RHEUMATOLOGY | Facility: CLINIC | Age: 58
End: 2023-05-04

## 2023-05-04 NOTE — TELEPHONE ENCOUNTER
Called to remind PT to turn in stool studies.  She stated that her son had surgery and she is staying with him until her  can go stay with him.  She will get them done as soon as she can.

## 2023-05-12 ENCOUNTER — TELEPHONE (OUTPATIENT)
Dept: GASTROENTEROLOGY | Facility: CLINIC | Age: 58
End: 2023-05-12

## 2023-05-12 NOTE — TELEPHONE ENCOUNTER
Spoke with pt. Advised that she needs to have blood work completed prior to refill. Verbalized understanding. She will come Mon or Tues next week for labs.

## 2023-05-12 NOTE — TELEPHONE ENCOUNTER
----- Message from Essence Marte sent at 2023  2:16 PM CDT -----  Regarding: med refill  Pt presented @ window and stated her rx for adalimumab (HUMIRA) 40 mg/0.8 mL SyKt injection Is  and will need a new one. Pt will be due for her next inj on  and can be reached @ [phone]          Thank You  Carmen SOOD

## 2023-05-29 NOTE — TELEPHONE ENCOUNTER
Spoke with pt. She has not had a chance to come in for labs. She plans to complete labs in the morning.

## 2023-06-07 ENCOUNTER — LAB VISIT (OUTPATIENT)
Dept: LAB | Facility: HOSPITAL | Age: 58
End: 2023-06-07
Attending: NURSE PRACTITIONER

## 2023-06-07 DIAGNOSIS — R19.7 DIARRHEA, UNSPECIFIED TYPE: ICD-10-CM

## 2023-06-07 DIAGNOSIS — K50.018 CROHN'S DISEASE OF SMALL INTESTINE WITH OTHER COMPLICATION: ICD-10-CM

## 2023-06-07 LAB
ALBUMIN SERPL-MCNC: 3.9 G/DL (ref 3.5–5)
ALBUMIN/GLOB SERPL: 1.1 RATIO (ref 1.1–2)
ALP SERPL-CCNC: 68 UNIT/L (ref 40–150)
ALT SERPL-CCNC: 11 UNIT/L (ref 0–55)
AST SERPL-CCNC: 12 UNIT/L (ref 5–34)
BASOPHILS # BLD AUTO: 0.06 X10(3)/MCL
BASOPHILS NFR BLD AUTO: 0.6 %
BILIRUBIN DIRECT+TOT PNL SERPL-MCNC: 0.4 MG/DL
BUN SERPL-MCNC: 22 MG/DL (ref 9.8–20.1)
CALCIUM SERPL-MCNC: 9.6 MG/DL (ref 8.4–10.2)
CHLORIDE SERPL-SCNC: 106 MMOL/L (ref 98–107)
CO2 SERPL-SCNC: 25 MMOL/L (ref 22–29)
CREAT SERPL-MCNC: 0.85 MG/DL (ref 0.55–1.02)
CRP SERPL HS-MCNC: 10.55 MG/L
DEPRECATED CALCIDIOL+CALCIFEROL SERPL-MC: 29.7 NG/ML (ref 30–80)
EOSINOPHIL # BLD AUTO: 0.35 X10(3)/MCL (ref 0–0.9)
EOSINOPHIL NFR BLD AUTO: 3.5 %
ERYTHROCYTE [DISTWIDTH] IN BLOOD BY AUTOMATED COUNT: 13.4 % (ref 11.5–17)
FERRITIN SERPL-MCNC: 20.11 NG/ML (ref 4.63–204)
FOLATE SERPL-MCNC: 13.5 NG/ML (ref 7–31.4)
GFR SERPLBLD CREATININE-BSD FMLA CKD-EPI: >60 MLS/MIN/1.73/M2
GLOBULIN SER-MCNC: 3.4 GM/DL (ref 2.4–3.5)
GLUCOSE SERPL-MCNC: 212 MG/DL (ref 74–100)
HBV SURFACE AG SERPL QL IA: NONREACTIVE
HCT VFR BLD AUTO: 40.8 % (ref 37–47)
HGB BLD-MCNC: 13.7 G/DL (ref 12–16)
IMM GRANULOCYTES # BLD AUTO: 0.03 X10(3)/MCL (ref 0–0.04)
IMM GRANULOCYTES NFR BLD AUTO: 0.3 %
IRON SERPL-MCNC: 49 UG/DL (ref 50–170)
LYMPHOCYTES # BLD AUTO: 2.99 X10(3)/MCL (ref 0.6–4.6)
LYMPHOCYTES NFR BLD AUTO: 29.6 %
MCH RBC QN AUTO: 31.1 PG (ref 27–31)
MCHC RBC AUTO-ENTMCNC: 33.6 G/DL (ref 33–36)
MCV RBC AUTO: 92.5 FL (ref 80–94)
MONOCYTES # BLD AUTO: 0.49 X10(3)/MCL (ref 0.1–1.3)
MONOCYTES NFR BLD AUTO: 4.9 %
NEUTROPHILS # BLD AUTO: 6.17 X10(3)/MCL (ref 2.1–9.2)
NEUTROPHILS NFR BLD AUTO: 61.1 %
NRBC BLD AUTO-RTO: 0 %
PLATELET # BLD AUTO: 328 X10(3)/MCL (ref 130–400)
PMV BLD AUTO: 9.9 FL (ref 7.4–10.4)
POTASSIUM SERPL-SCNC: 4.3 MMOL/L (ref 3.5–5.1)
PROT SERPL-MCNC: 7.3 GM/DL (ref 6.4–8.3)
RBC # BLD AUTO: 4.41 X10(6)/MCL (ref 4.2–5.4)
SODIUM SERPL-SCNC: 139 MMOL/L (ref 136–145)
VIT B12 SERPL-MCNC: 498 PG/ML (ref 213–816)
WBC # SPEC AUTO: 10.09 X10(3)/MCL (ref 4.5–11.5)

## 2023-06-07 PROCEDURE — 86141 C-REACTIVE PROTEIN HS: CPT

## 2023-06-07 PROCEDURE — 82607 VITAMIN B-12: CPT

## 2023-06-07 PROCEDURE — 82306 VITAMIN D 25 HYDROXY: CPT

## 2023-06-07 PROCEDURE — 36415 COLL VENOUS BLD VENIPUNCTURE: CPT

## 2023-06-07 PROCEDURE — 82746 ASSAY OF FOLIC ACID SERUM: CPT

## 2023-06-07 PROCEDURE — 86480 TB TEST CELL IMMUN MEASURE: CPT

## 2023-06-07 PROCEDURE — 85025 COMPLETE CBC W/AUTO DIFF WBC: CPT

## 2023-06-07 PROCEDURE — 84630 ASSAY OF ZINC: CPT

## 2023-06-07 PROCEDURE — 82728 ASSAY OF FERRITIN: CPT

## 2023-06-07 PROCEDURE — 80053 COMPREHEN METABOLIC PANEL: CPT

## 2023-06-07 PROCEDURE — 83540 ASSAY OF IRON: CPT

## 2023-06-07 PROCEDURE — 87340 HEPATITIS B SURFACE AG IA: CPT

## 2023-06-07 PROCEDURE — 80145 DRUG ASSAY ADALIMUMAB: CPT

## 2023-06-08 ENCOUNTER — PATIENT MESSAGE (OUTPATIENT)
Dept: GASTROENTEROLOGY | Facility: CLINIC | Age: 58
End: 2023-06-08

## 2023-06-08 NOTE — PROGRESS NOTES
Labs reviewed. Will continue with current treatment plan. In addition, okay to use Imodium for diarrhea and would start fiber supplement if not currently taking one. Also encourage optimal control of blood sugar. Encourage patient to turn in stool sample and keep clinic appointment as scheduled on 7/11/2023.

## 2023-06-09 LAB
ADALIMUMAB SERPL IA-MCNC: 3.7 MCG/ML
BEAKER SEE SCANNED REPORT: NORMAL
GAMMA INTERFERON BACKGROUND BLD IA-ACNC: 0.01 IU/ML
M TB IFN-G BLD-IMP: NEGATIVE
M TB IFN-G CD4+ BCKGRND COR BLD-ACNC: -0.01 IU/ML
M TB IFN-G CD4+CD8+ BCKGRND COR BLD-ACNC: -0.01 IU/ML
MITOGEN IGNF BCKGRD COR BLD-ACNC: 9.99 IU/ML

## 2023-07-10 PROBLEM — K50.80 CROHN'S DISEASE OF BOTH SMALL AND LARGE INTESTINE WITHOUT COMPLICATION: Status: ACTIVE | Noted: 2023-07-10

## 2023-07-10 PROBLEM — K59.1 FUNCTIONAL DIARRHEA: Status: ACTIVE | Noted: 2023-07-10

## 2023-07-10 PROBLEM — Z72.0 TOBACCO USE: Status: ACTIVE | Noted: 2023-07-10

## 2023-07-10 NOTE — PROGRESS NOTES
"                               Inflammatory Bowel Disease        Established Patient Note         TODAY'S VISIT DATE:  07/11/2023  The patient's last visit with me was on Visit date not found.     PCP: Waylon Koenig      Referring MD:   No ref. provider found    History of Present Illness:    Ms. Richmond is a 57 year old female with small bowel fistulizing Crohn's disease s/p ileocecectomy in 2016 on Humira 40mg weekly for follow-up.    She was diagnosed with Crohn's disease around 1996 when she presented with nausea, vomiting, abdominal pain. She had a surgical procedure to "remove an infection." She recalls being placed on steroids and at one point Asacol without significant improvement. She also remembers being briefly on Remicade at one point. She moved to [place] and was seen by a gastroenterologist there. I do not have any records of what transpired during her time in [place]. She moved back and went several years without seeing a gastroenterologist due to insurance reasons.    In November 2014 she was seen in GI clinic here. Prior to that she had presented to the emergency department several times with abdominal pain. CT scan in 2014 described severe mural thickening and mesenteric fat stranding in the right lower quadrant involving the terminal ileum, cecum, descending colon and to a lesser degree the transverse colon. There was evidence of a colovesicular fistula, enteroenteric fistula between the sigmoid and cecum and between the ileum and cecum. 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 She was initially given steroids and sulfasalazine. On January 19, 2015, she had a colonoscopy that showed an inflammatory mass around the area of the ileocecal valve and cecum, possible fistula in the right side of the colon, normal ascending, transverse and descending colon, sigmoid diverticulosis along with small polyps not resected in the sigmoid. Pathology report of the right colon mass showed " severe chronic inflammation and biopsies of the sigmoid colon polyp showed mild chronic inflammation.    She was started on Humira 40 mg every other week for maintenance. 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 On October 14, 2016, she underwent an exploratory laparotomy, small bowel resection with ileocecectomy, ileocolonic anastomosis, and takedown of an enterosigmoid fistula with primary repair of sigmoid defect.    Following resection she was started back on Humira 40 mg every other week. She's had chronic diarrhea since resection.     February 2019 colonoscopy showed Rutgeerts i2a disease with ulcerations on IC anastomosis and one aphthous ulcer in the neoti.   Feb 2019: fecal calprotectin: <16  Feb 2019 ADA trough level: 1.9, Ab < 25    Humira was increased to 40 mg weekly and mtx/folic acid added. I recommend cholestyramine for diarrhea but she never tried this. She did not remain compliant with methotrexate.    October 2019: ADA trough: 10, Ab < 25 on Humira 40mg weekly monotherapy.    October 2021: ADA trough 9.1, Ab < 25  October 2021 Colonoscopy: Rutgeerts i2a with ulcerations on the anastomosis only, patent IC anastomosis, normal neoterminal ileum, pseudopolyps and diverticulosis in the sigmoid colon.    She has always had problems with diarrhea.  She could not tolerate cholestyramine powder in the past.  She is still on metformin.  She was having problems with significant diarrhea, up to 30 Bms a day at times a few months ago.  She took out dairy (was drinking a lot of milk).  She is now having 6-7 stools a day, always loose, without any bleeding.  She does report problems with prolapsed painful hemorrhoids with a BM, type II, that reduces and no longer hurts.  Imodium was helpful in the past.  She uses it now prn as she is nervous it will cause constipation if she takes it regularly.  Remains on Humira weekly.      She denies a family history of inflammatory bowel  disease, colon polyps, or colon cancer. She is still smoking 3/4 ppd. She denies any significant alcohol or recreational drugs.    IBD History:  IBD disease: Crohn's disease  Disease location: Ileocolonic  Disease behavior: stricturing, penetrating      Current IBD Therapy:  Adalimumab 40mg weekly (February 2019 - present)    Therapeutic Drug Monitoring Labs:  June 2023 ADA trough 3.7 ([facility])  October 2021: ADA trough 9.1, Ab < 25 ([facility])  October 2019: ADA trough 10, Ab < 25    Prior IBD Therapies:  Steroids  Sulfasalazine  Asacol  Remicade      Pertinent Endoscopy/Imaging:  January 19, 2015: Colonoscopy: inflammatory mass around the area of the ileocecal valve and cecum, possible fistula in the right side of the colon, normal ascending, transverse and descending colon, sigmoid diverticulosis along with small polyps not resected in the sigmoid. Pathology report of the right colon mass showed severe chronic inflammation and biopsies of the sigmoid colon polyp showed mild chronic inflammation.    February 20, 2019: Colonoscopy: skin tags, mild puckering of the perianal skin. Evidence of a prior ileocecectomy with patent anastomosis. Superficial ulcerations involving the anastomosis only except for one 3 mm ulcer in the neoterminal ileum. The rest of the neoterminal ileum appeared normal. Approximately 15 cm of the neoterminal ileum was intubated. Biopsies were taken. Rutgeerts score i2a. The remaining colon was normal except for scattered diverticuli in the sigmoid colon. She did have an area in the sigmoid with some scarring and 2 pseudopolyps. Gerard terminal ileum biopsy: mild chronic active inflammation. No significant glandular distortion, cryptitis, granulomas, or fibrosis.    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 External and internal hemorrhoids      Prior  Pertinent Surgeries:   2016: exp laparotomy, small bowel resection with ileocecectomy, ileocolonic anastomosis, and takedown of an enterosigmoid fistula with primary repair of sigmoid defect. Pathology from this resection showed severely adherent intestinal severe transmural inflammation, fibrosis, and fistulas along with chronic inflammation, mucosal ulcerations and fibrosis consistent with Crohn's disease.    Complications:   penetrating disease as above      Extraintestinal Manifestations:  Arthritis    Review of Systems   Constitutional:  Negative for appetite change and unexpected weight change.   HENT:  Negative for trouble swallowing.    Respiratory:  Negative for chest tightness.    Cardiovascular: Negative.    Gastrointestinal:  Positive for diarrhea and rectal pain. Negative for abdominal pain, change in bowel habit and change in bowel habit.   Musculoskeletal:  Positive for arthralgias.   Neurological: Negative.    Psychiatric/Behavioral: Negative.     All other systems reviewed and are negative.       Medical/Surgical History:   Past Medical History:   Diagnosis Date    Crohn's disease     Diabetes mellitus      Past Surgical History:   Procedure Laterality Date     SECTION      CHOLECYSTECTOMY      TONSILLECTOMY, ADENOIDECTOMY           Family History:   Family History   Problem Relation Age of Onset    Hypertension Father     Diabetes Father     Diabetes Mother     Thyroid cancer Mother     Diabetes Brother     Diabetes Sister         Social History:   Social History     Socioeconomic History    Marital status:    Tobacco Use    Smoking status: Every Day     Packs/day: 1.00     Types: Cigarettes    Smokeless tobacco: Never   Substance and Sexual Activity    Alcohol use: Not Currently    Drug use: Never    Sexual activity: Yes     Birth control/protection: Post-menopausal        Review of patient's allergies indicates:   Allergen Reactions    Iodine Swelling, Itching and  "Shortness Of Breath     Other reaction(s): Swelling of throat    Latex Hives, Itching and Swelling    Shellfish containing products Swelling     Other reaction(s): Swelling of throat    Codeine Hives and Itching       Current Medications:   Outpatient Medications Marked as Taking for the 7/11/23 encounter (Office Visit) with Izzy Rooney MD   Medication Sig Dispense Refill    glipiZIDE 5 MG TR24 Take 5 mg by mouth.      metFORMIN (GLUCOPHAGE) 1000 MG tablet Take 1,000 mg by mouth.      multivit with minerals/lutein (MULTIVITAMIN 50 PLUS ORAL) Take 1 tablet by mouth Daily.      [DISCONTINUED] adalimumab (HUMIRA) 40 mg/0.8 mL SyKt injection Inject 0.8 mLs (40 mg total) into the skin every 7 days. 4 each 11        Vital Signs:  /77 (BP Location: Left arm, Patient Position: Sitting, BP Method: Large (Automatic))   Pulse 77   Temp 97.8 °F (36.6 °C) (Oral)   Resp 16   Ht 5' 4" (1.626 m)   Wt 74.8 kg (165 lb)   SpO2 98%   BMI 28.32 kg/m²      Physical Exam  Constitutional:       Appearance: Normal appearance.   HENT:      Head: Normocephalic and atraumatic.      Mouth/Throat:      Mouth: Mucous membranes are moist.   Eyes:      Conjunctiva/sclera: Conjunctivae normal.   Cardiovascular:      Rate and Rhythm: Normal rate and regular rhythm.   Pulmonary:      Effort: Pulmonary effort is normal.      Breath sounds: Normal breath sounds.   Abdominal:      General: Bowel sounds are normal.      Palpations: Abdomen is soft.   Musculoskeletal:         General: Normal range of motion.      Cervical back: Normal range of motion.   Skin:     General: Skin is warm.   Neurological:      General: No focal deficit present.      Mental Status: She is alert and oriented to person, place, and time. Mental status is at baseline.   Psychiatric:         Mood and Affect: Mood normal.         Behavior: Behavior normal.       Labs: Reviewed  Hgb   Date Value Ref Range Status   06/07/2023 13.7 12.0 - 16.0 g/dL Final "     Albumin Level   Date Value Ref Range Status   06/07/2023 3.9 3.5 - 5.0 g/dL Final     Iron Level   Date Value Ref Range Status   06/07/2023 49 (L) 50 - 170 ug/dL Final     Ferritin Level   Date Value Ref Range Status   06/07/2023 20.11 4.63 - 204.00 ng/mL Final     Folate Level   Date Value Ref Range Status   06/07/2023 13.5 7.0 - 31.4 ng/mL Final     Vitamin B12 Level   Date Value Ref Range Status   06/07/2023 498 213 - 816 pg/mL Final     Vit D 25 OH   Date Value Ref Range Status   06/07/2023 29.7 (L) 30.0 - 80.0 ng/mL Final     C-Reactive Protein   Date Value Ref Range Status   10/29/2019 1.3 (H) <<=0.3 mg/dL Final     Hepatitis B Surface Antigen   Date Value Ref Range Status   06/07/2023 Nonreactive Nonreactive Final           Zinc: 80.5 (2023)  Quantiferon gold: negative June 2023  HS CRP: 10.55 (2023)  Fecal calprotectin: <16 (Feb 2019)       Assessment/Plan:    Problem List Items Addressed This Visit          GI    Crohn's disease of both small and large intestine without complication     History of enterovesicular, enteroenteric fistulas, and enterocutaneous fistulas s/p ileocecectomy in October 2016   Endoscopic remission based on last endoscopies - last October 2021 Rutgeerts i2a  Monotherapy for several years: Humira 40mg every week  Therapeutic trough. Continue current dose  Stop smoking  Recheck labs, CRP, and fecal calprotectin  Repeat colonoscopy in 2024           Relevant Orders    Calprotectin, Stool    Functional diarrhea     Consider discontinuation of metformin if diabetes treatment allows  Good diabetes control  Can use Imodium more regularly than prn.  Discussed this with patient.  Discussed other options including welchol which we have discussed in the past  Continue to refrain from dairy  Fecal calprotectin now              Other    Tobacco use     Patient was counseled extensively on tobacco cessation.  Education materials were given to patient.             Other Visit Diagnoses        Crohn's disease without complication, unspecified gastrointestinal tract location        Relevant Medications    adalimumab (HUMIRA) 40 mg/0.8 mL SyKt injection              HEALTH MAINTENANCE:     Vaccinations:    Influenza (inactive):  recommended annually   PCV 13 (Prevnar): August 2019  PPSV 23 (Pneumovax): October 2019, repeat 5 years later and then after age 66 yo  Tetanus (TdaP): October 2017, recommended every 10 years  HPV (males and females ages 11-44 yo): N/A  Meningococcal: no risk factors  Hepatitis B: Twinrix x 2, 2019, recheck HBsAb  Hepatitis A:  Twinrix x 2 2019, recheck HAV IgG  MMR (live vaccine): UTD   Chickenpox status/Varicella (live vaccine):  had chickenpox as a child  Shingrix: vaccinated x 2  COVID-19: Pfizer 2021/2022      Colorectal cancer risk:  Risk factors: >  8 years of disease, > 1/3 colon involved    - Distribution of colonic disease: < 1/3 of colon       Ophthalmologic exam recommended yearly  Dermatologic exam recommended yearly due to risk of skin cancer with IBD/meds    Bone health:   Calcium 0748-3920 mg daily and vitamin D 800 IU daily   Risk factors for osteopenia/osteoporosis: none   Vitamin D: 29.7   Ergocalciferol 50,000 IU weekly x 12 weeks     DEXA scan: Recommend baseline    Smoking status: current smoker      Follow up: 6 months

## 2023-07-10 NOTE — ASSESSMENT & PLAN NOTE
Patient was counseled extensively on tobacco cessation.  Education materials were given to patient.

## 2023-07-10 NOTE — ASSESSMENT & PLAN NOTE
History of enterovesicular, enteroenteric fistulas, and enterocutaneous fistulas s/p ileocecectomy in October 2016   Endoscopic remission based on last endoscopies - last October 2021 Chai i2a  Monotherapy for several years: Humira 40mg every week  Therapeutic trough. Continue current dose  Stop smoking  Recheck labs, CRP, and fecal calprotectin  Repeat colonoscopy in 2024

## 2023-07-10 NOTE — ASSESSMENT & PLAN NOTE
Consider discontinuation of metformin if diabetes treatment allows  Good diabetes control  Can use Imodium more regularly than prn.  Discussed this with patient.  Discussed other options including welchol which we have discussed in the past  Continue to refrain from dairy  Fecal calprotectin now

## 2023-07-11 ENCOUNTER — OFFICE VISIT (OUTPATIENT)
Dept: GASTROENTEROLOGY | Facility: CLINIC | Age: 58
End: 2023-07-11

## 2023-07-11 VITALS
HEIGHT: 64 IN | DIASTOLIC BLOOD PRESSURE: 77 MMHG | HEART RATE: 77 BPM | SYSTOLIC BLOOD PRESSURE: 121 MMHG | TEMPERATURE: 98 F | OXYGEN SATURATION: 98 % | RESPIRATION RATE: 16 BRPM | WEIGHT: 165 LBS | BODY MASS INDEX: 28.17 KG/M2

## 2023-07-11 DIAGNOSIS — K50.80 CROHN'S DISEASE OF BOTH SMALL AND LARGE INTESTINE WITHOUT COMPLICATION: ICD-10-CM

## 2023-07-11 DIAGNOSIS — K50.90 CROHN'S DISEASE WITHOUT COMPLICATION, UNSPECIFIED GASTROINTESTINAL TRACT LOCATION: ICD-10-CM

## 2023-07-11 DIAGNOSIS — Z72.0 TOBACCO USE: ICD-10-CM

## 2023-07-11 DIAGNOSIS — K59.1 FUNCTIONAL DIARRHEA: ICD-10-CM

## 2023-07-11 PROCEDURE — 99215 OFFICE O/P EST HI 40 MIN: CPT | Mod: PBBFAC | Performed by: INTERNAL MEDICINE

## 2023-07-11 RX ORDER — ERGOCALCIFEROL 1.25 MG/1
50000 CAPSULE ORAL
Qty: 12 CAPSULE | Refills: 1 | Status: SHIPPED | OUTPATIENT
Start: 2023-07-11 | End: 2023-12-20

## 2023-07-11 RX ORDER — FERROUS SULFATE 324(65)MG
324 TABLET, DELAYED RELEASE (ENTERIC COATED) ORAL EVERY OTHER DAY
Qty: 15 TABLET | Refills: 4 | Status: SHIPPED | OUTPATIENT
Start: 2023-07-11 | End: 2023-12-08

## 2023-07-11 RX ORDER — ADALIMUMAB 40MG/0.8ML
40 KIT SUBCUTANEOUS
Qty: 4 EACH | Refills: 11 | Status: SHIPPED | OUTPATIENT
Start: 2023-07-11 | End: 2024-06-11

## 2023-08-15 NOTE — PROGRESS NOTES
Patient ID: [id number]     Chief Complaint: Establish Care and Diabetes (States blood  sugar is high. Not eating well.)    HPI:     Janel Richmond is a 57 y.o. female with diagnosis of DM2, Crohn's Disease. Patient seen today to establish care.   Patient currently prescribed Glipizide 5 mg daily, Metformin 1,000 mg bid for DM2. Patient states taking medication as prescribed, tolerating. Patient states she checks CBGs at home, states hypoglycemia at times. States she is on the road a lot and doesn't have a good diet or eating schedule.   Patient also states she was prescribed Lisinopril for her DM2, not HTN. Patient denies HTN, headaches, SOB, chest pain.   Today, patient states she does have trouble falling asleep, states her mind races at night. States last October, her son was in an accident and she is helping care for him. States he  is a .   Patient has a walking boot, states she has a foot fracture. States improving.   Patient denies any other acute complaints.     Patient followed by GI Clinic. Last appointment on 07/11/2023. 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 Endoscopic remission based on last endoscopies - last October 2021 Rutgeerts i2a. Monotherapy for several years: Humira 40mg every week. Therapeutic trough. Continue current dose. Stop smoking. Recheck labs, CRP, and fecal calprotectin. Repeat colonoscopy in 2024. Functional diarrhea: Consider discontinuation of metformin if diabetes treatment allows. Good diabetes control. Can use Imodium more regularly than prn, Discussed this with patient. Discussed other options including welchol which we have discussed in the past. Continue to refrain from dairy. Fecal calprotectin now. Patient has follow up appointment scheduled for 01/16/2024.     Patient followed by GYN Clinic. Last appointment  on 2022. Patient has follow up appointment scheduled for 2023.     Review of patient's allergies indicates:   Allergen Reactions    Iodine Swelling, Itching and Shortness Of Breath     Other reaction(s): Swelling of throat    Latex Hives, Itching and Swelling    Shellfish containing products Swelling     Other reaction(s): Swelling of throat    Codeine Hives and Itching     Breast Cancer Screening: MMG ordered 2022  Cervical Cancer Screenin2022  Colorectal Cancer Screening: 10/29/2021  Diabetic Eye Exam: ordered 2023  Diabetic Foot Exam: 2023  Lung Cancer Screening: refused due to no insurance  Prostate Cancer Screening: N/A  AAA Screening: N/A  Osteoporosis Screening: deferred due to age  Medicare Wellness: N/A  Immunizations:   Immunization History   Administered Date(s) Administered    COVID-19, MRNA, LN-S, PF (Pfizer) (Gray Cap) 2022    COVID-19, MRNA, LN-S, PF (Pfizer) (Purple Cap) 2021, 2021, 2021    Hepatitis A / Hepatitis B 2019, 10/29/2019    Influenza - Trivalent - PF (ADULT) 10/21/2021    PPD Test 11/10/2014    Pneumococcal Conjugate - 13 Valent 2019    Pneumococcal Polysaccharide - 23 Valent 10/29/2019    Tdap 10/09/2017    Zoster Recombinant 2019, 2021     Past Surgical History:   Procedure Laterality Date     SECTION      CHOLECYSTECTOMY      colon rescetion  10/14/2013    TONSILLECTOMY, ADENOIDECTOMY       family history includes Diabetes in her brother, father, mother, and sister; Hypertension in her father; Thyroid cancer in her mother.    Social History     Socioeconomic History    Marital status:    Tobacco Use    Smoking status: Every Day     Current packs/day: 1.00     Types: Cigarettes    Smokeless tobacco: Never   Substance and Sexual Activity    Alcohol use: Yes     Comment: ocassionally    Drug use: Never    Sexual activity: Yes     Partners: Male     Birth control/protection:  Post-menopausal     Social Determinants of Health     Financial Resource Strain: Medium Risk (8/16/2023)    Overall Financial Resource Strain (CARDIA)     Difficulty of Paying Living Expenses: Somewhat hard   Food Insecurity: No Food Insecurity (8/16/2023)    Hunger Vital Sign     Worried About Running Out of Food in the Last Year: Never true     Ran Out of Food in the Last Year: Never true   Transportation Needs: No Transportation Needs (8/16/2023)    PRAPARE - Transportation     Lack of Transportation (Medical): No     Lack of Transportation (Non-Medical): No   Physical Activity: Sufficiently Active (8/16/2023)    Exercise Vital Sign     Days of Exercise per Week: 7 days     Minutes of Exercise per Session: 60 min   Stress: Stress Concern Present (8/16/2023)    [employer] - Occupational Stress Questionnaire     Feeling of Stress : Rather much   Social Connections: Socially Integrated (8/16/2023)    Social Connection and Isolation Panel [NHANES]     Frequency of Communication with Friends and Family: More than three times a week     Frequency of Social Gatherings with Friends and Family: Once a week     Attends Christianity Services: More than 4 times per year     Active Member of Clubs or Organizations: Yes     Attends Club or Organization Meetings: More than 4 times per year     Marital Status:    Housing Stability: Low Risk  (8/16/2023)    Housing Stability Vital Sign     Unable to Pay for Housing in the Last Year: No     Number of Places Lived in the Last Year: 1     Unstable Housing in the Last Year: No     Current Outpatient Medications   Medication Instructions    biotin 5,000 mcg TbDL 1 tablet, Oral, Daily    ergocalciferol (ERGOCALCIFEROL) 50,000 Units, Oral, Every 7 days    ferrous sulfate 324 mg, Oral, Every other day    glipiZIDE 5 mg, Oral    HUMIRA 40 mg, Subcutaneous, Every 7 days    hydrOXYzine HCL (ATARAX) 25 mg, Oral, Nightly PRN    lisinopriL 10 mg, Oral     "metFORMIN (GLUCOPHAGE) 1,000 mg, Oral    multivit with minerals/lutein (MULTIVITAMIN 50 PLUS ORAL) 1 tablet, Oral, Daily       Subjective:     Review of Systems   Constitutional: Negative.    HENT: Negative.     Eyes: Negative.    Respiratory: Negative.     Cardiovascular: Negative.    Gastrointestinal:  Positive for diarrhea.   Endocrine: Negative.    Genitourinary: Negative.    Musculoskeletal: Negative.    Skin: Negative.    Allergic/Immunologic: Negative.    Neurological: Negative.    Hematological: Negative.    Psychiatric/Behavioral:  Positive for sleep disturbance.        Objective:     Visit Vitals  /81 (BP Location: Left arm, Patient Position: Sitting, BP Method: Large (Automatic))   Pulse 75   Temp 97.7 °F (36.5 °C) (Oral)   Resp 18   Ht 5' 4.02" (1.626 m)   Wt 71.7 kg (158 lb)   BMI 27.11 kg/m²     Physical Exam  Vitals reviewed.   Constitutional:       Appearance: Normal appearance.   HENT:      Head: Normocephalic and atraumatic.      Mouth/Throat:      Mouth: Mucous membranes are moist.      Pharynx: Oropharynx is clear.   Eyes:      Extraocular Movements: Extraocular movements intact.      Conjunctiva/sclera: Conjunctivae normal.      Pupils: Pupils are equal, round, and reactive to light.   Cardiovascular:      Rate and Rhythm: Normal rate and regular rhythm.      Heart sounds: Normal heart sounds.   Pulmonary:      Effort: Pulmonary effort is normal.      Breath sounds: Normal breath sounds.   Abdominal:      General: Bowel sounds are normal.   Musculoskeletal:         General: Normal range of motion.      Cervical back: Normal range of motion.      Comments: Walking boot to right foot   Skin:     General: Skin is warm and dry.   Neurological:      Mental Status: She is alert and oriented to person, place, and time.   Psychiatric:         Mood and Affect: Mood normal.         Behavior: Behavior normal.       Protective Sensation (w/ 10 gram monofilament):  Right:  UTO due to walking " boot  Left: Intact    Visual Inspection:  Normal -  Left    Pedal Pulses:   Right:  UTO due to walking boot  Left: Present    Posterior Tibialis Pulses:   Right: UTO due to walking boot  Left: Present      Labs Reviewed:     Hematology:  Lab Results   Component Value Date    WBC 10.09 06/07/2023    HGB 13.7 06/07/2023    HCT 40.8 06/07/2023     06/07/2023     Chemistry:  Lab Results   Component Value Date     06/07/2023    K 4.3 06/07/2023    CHLORIDE 106 06/07/2023    BUN 22.0 (H) 06/07/2023    CREATININE 0.85 06/07/2023    EGFRNORACEVR >60 06/07/2023    GLUCOSE 212 (H) 06/07/2023    CALCIUM 9.6 06/07/2023    ALKPHOS 68 06/07/2023    LABPROT 7.3 06/07/2023    ALBUMIN 3.9 06/07/2023    BILIDIR 0.2 11/22/2021    IBILI 0.40 11/22/2021    AST 12 06/07/2023    ALT 11 06/07/2023    [id number] 29.7 (L) 06/07/2023     Lab Results   Component Value Date    HGBA1C 8.2 (H) 11/22/2021     Lipid Panel:  Lab Results   Component Value Date    CHOL 194 03/19/2021    HDL 52 03/19/2021    .00 03/19/2021    TRIG 174 (H) 03/19/2021    TOTALCHOLEST 4 03/19/2021     Thyroid:  Lab Results   Component Value Date    TSH 1.4909 03/19/2021    T3FREE 3.06 05/30/2018     Urine:  Lab Results   Component Value Date    APPEARANCEUA Clear 03/19/2021    PROTEINUA 20 (A) 03/19/2021    LEUKOCYTESUR Negative 03/19/2021    RBCUA 0-2 03/19/2021    WBCUA 3-5 (A) 03/19/2021    BACTERIA Few 03/19/2021    SQEPUA >100 (A) 03/19/2021    HYALINECASTS 0-2 (A) 03/19/2021    CREATRANDUR 195.2 (H) 03/19/2021    PROTEINURINE 28.7 10/29/2019        Assessment:       ICD-10-CM ICD-9-CM   1. Type 2 diabetes mellitus with hyperglycemia, without long-term current use of insulin  E11.65 250.00     790.29   2. Vitamin D deficiency  E55.9 268.9   3. Crohn's disease of both small and large intestine without complication  K50.80 555.2   4. Insomnia, unspecified type  G47.00 780.52   5. Tobacco use  Z72.0 305.1   6. Diabetic retinopathy screening  Z13.5  V80.2        Plan:     1. Type 2 diabetes mellitus with hyperglycemia, without long-term current use of insulin  Continue Metformin 1,000 mg bid, Glipizide XL 5 mg daily  Encouraged home CBG monitoring.  Hypoglycemic episodes: yes, patient instructed to eat at regular time intervals  Body mass index is 27.11 kg/m².  Hemoglobin A1c   Date Value Ref Range Status   11/22/2021 8.2 (H) <<=7.0 % Final     Results for orders placed or performed in visit on 03/19/21   Microalbumin/Creatinine Ratio, Urine   Result Value Ref Range    Microalbumin Creatinine Ratio 6.6 0.0 - 30.0 mg/gm Cr    Urine Creatinine 195.2 (H) 45.0 - 106.0 mg/dL    Urine Microalbumin 12.9 <<=30.0 mg/L   No Statin noted  On Lisinopril  Weight Loss Encouraged  ADA Diet  - CBC Auto Differential; Future  - Comprehensive Metabolic Panel; Future  - Hemoglobin A1C; Future  - Lipid Panel; Future  - Urinalysis; Future  - Microalbumin/Creatinine Ratio, Urine; Future  - TSH; Future  - Urinalysis    2. Vitamin D deficiency  Vitamin D level: 29.7  Continue Vitamin D 50,000 units weekly    3. Crohn's disease of both small and large intestine without complication  Followed by GI Clinic    4. Insomnia, unspecified type  Start Hydroxyzine 25 mg Qhs prn, may increase to 50 mg Qhs if needed    5. Tobacco use  Smoking cessation education provided, encouraged. Informed of smoking cessation program. Patient refused smoking cessation at this time. I spent 3 minutes discussing smoking cessation with patient.     6. Diabetic retinopathy screening  - Diabetic Eye Screening Photo      Follow up in about 1 week (around 8/23/2023) for Labs, Virtual Visit. In addition to their scheduled follow up, the patient has also been instructed to follow up on as needed basis.     Viv Lipscomb, GLADYS

## 2023-08-16 ENCOUNTER — OFFICE VISIT (OUTPATIENT)
Dept: INTERNAL MEDICINE | Facility: CLINIC | Age: 58
End: 2023-08-16

## 2023-08-16 VITALS
HEART RATE: 75 BPM | WEIGHT: 158 LBS | DIASTOLIC BLOOD PRESSURE: 81 MMHG | RESPIRATION RATE: 18 BRPM | BODY MASS INDEX: 26.98 KG/M2 | TEMPERATURE: 98 F | HEIGHT: 64 IN | SYSTOLIC BLOOD PRESSURE: 133 MMHG

## 2023-08-16 DIAGNOSIS — E55.9 VITAMIN D DEFICIENCY: Chronic | ICD-10-CM

## 2023-08-16 DIAGNOSIS — Z13.5 DIABETIC RETINOPATHY SCREENING: ICD-10-CM

## 2023-08-16 DIAGNOSIS — G47.00 INSOMNIA, UNSPECIFIED TYPE: ICD-10-CM

## 2023-08-16 DIAGNOSIS — E11.65 TYPE 2 DIABETES MELLITUS WITH HYPERGLYCEMIA, WITHOUT LONG-TERM CURRENT USE OF INSULIN: Primary | Chronic | ICD-10-CM

## 2023-08-16 DIAGNOSIS — K50.80 CROHN'S DISEASE OF BOTH SMALL AND LARGE INTESTINE WITHOUT COMPLICATION: Chronic | ICD-10-CM

## 2023-08-16 DIAGNOSIS — Z72.0 TOBACCO USE: Chronic | ICD-10-CM

## 2023-08-16 PROBLEM — E11.9 DIABETES MELLITUS: Status: ACTIVE | Noted: 2023-08-16

## 2023-08-16 PROCEDURE — 99406 PR TOBACCO USE CESSATION INTERMEDIATE 3-10 MINUTES: ICD-10-PCS | Mod: S$PBB,,, | Performed by: NURSE PRACTITIONER

## 2023-08-16 PROCEDURE — 99406 BEHAV CHNG SMOKING 3-10 MIN: CPT | Mod: S$PBB,,, | Performed by: NURSE PRACTITIONER

## 2023-08-16 PROCEDURE — 99204 OFFICE O/P NEW MOD 45 MIN: CPT | Mod: 25,S$PBB,, | Performed by: NURSE PRACTITIONER

## 2023-08-16 PROCEDURE — 99215 OFFICE O/P EST HI 40 MIN: CPT | Mod: PBBFAC | Performed by: NURSE PRACTITIONER

## 2023-08-16 PROCEDURE — 99204 PR OFFICE/OUTPT VISIT, NEW, LEVL IV, 45-59 MIN: ICD-10-PCS | Mod: 25,S$PBB,, | Performed by: NURSE PRACTITIONER

## 2023-08-16 RX ORDER — HYDROXYZINE HYDROCHLORIDE 25 MG/1
25 TABLET, FILM COATED ORAL NIGHTLY PRN
Qty: 30 TABLET | Refills: 3 | Status: SHIPPED | OUTPATIENT
Start: 2023-08-16

## 2023-08-23 ENCOUNTER — OFFICE VISIT (OUTPATIENT)
Dept: INTERNAL MEDICINE | Facility: CLINIC | Age: 58
End: 2023-08-23

## 2023-08-23 DIAGNOSIS — E78.5 HYPERLIPIDEMIA, UNSPECIFIED HYPERLIPIDEMIA TYPE: Chronic | ICD-10-CM

## 2023-08-23 DIAGNOSIS — E11.65 TYPE 2 DIABETES MELLITUS WITH HYPERGLYCEMIA, WITHOUT LONG-TERM CURRENT USE OF INSULIN: Primary | Chronic | ICD-10-CM

## 2023-08-23 DIAGNOSIS — Z78.9 STATIN INTOLERANCE: Chronic | ICD-10-CM

## 2023-08-23 DIAGNOSIS — K50.80 CROHN'S DISEASE OF BOTH SMALL AND LARGE INTESTINE WITHOUT COMPLICATION: Chronic | ICD-10-CM

## 2023-08-23 DIAGNOSIS — E55.9 VITAMIN D DEFICIENCY: Chronic | ICD-10-CM

## 2023-08-23 PROCEDURE — 99214 OFFICE O/P EST MOD 30 MIN: CPT | Mod: FQ,95,, | Performed by: NURSE PRACTITIONER

## 2023-08-23 PROCEDURE — 99214 PR OFFICE/OUTPT VISIT, EST, LEVL IV, 30-39 MIN: ICD-10-PCS | Mod: FQ,95,, | Performed by: NURSE PRACTITIONER

## 2023-08-23 RX ORDER — GLIPIZIDE 5 MG/1
5 TABLET, FILM COATED, EXTENDED RELEASE ORAL
Qty: 90 TABLET | Refills: 1 | Status: SHIPPED | OUTPATIENT
Start: 2023-08-23

## 2023-08-23 RX ORDER — EZETIMIBE 10 MG/1
10 TABLET ORAL DAILY
Qty: 90 TABLET | Refills: 1 | Status: SHIPPED | OUTPATIENT
Start: 2023-08-23

## 2023-08-23 RX ORDER — LISINOPRIL 10 MG/1
10 TABLET ORAL DAILY
Qty: 90 TABLET | Refills: 1 | Status: SHIPPED | OUTPATIENT
Start: 2023-08-23

## 2023-08-23 RX ORDER — METFORMIN HYDROCHLORIDE 500 MG/1
1000 TABLET, EXTENDED RELEASE ORAL
Qty: 180 TABLET | Refills: 1 | Status: SHIPPED | OUTPATIENT
Start: 2023-08-23

## 2023-08-23 NOTE — PROGRESS NOTES
Patient ID: [id number]     Chief Complaint: Results    HPI:     Audio Only Telehealth Visit     The patient location is: home  The chief complaint leading to consultation is: follow up  Visit type: Virtual visit with audio only (telephone)  Total time spent with patient: 16 minutes     The reason for the audio only service rather than synchronous audio and video virtual visit was related to technical difficulties or patient preference/necessity.     Each patient to whom I provide medical services by telemedicine is:  (1) informed of the relationship between the physician and patient and the respective role of any other health care provider with respect to management of the patient; and (2) notified that they may decline to receive medical services by telemedicine and may withdraw from such care at any time. Patient verbally consented to receive this service via voice-only telephone call.  This service was not originating from a related E/M service provided within the previous 7 days nor will  to an E/M service or procedure within the next 24 hours or my soonest available appointment.  Prevailing standard of care was able to be met in this audio-only visit.       Janel Richmond is a 57 y.o. female with diagnosis of DM2, Crohn's Disease. Patient seen today for follow up. Patient last seen in clinic on 08/16/2023.   At previous appt, stated she was prescribed Glipizide 5 mg daily, Metformin 1,000 mg bid for DM2. Patient states she is not taking Glipizide but is taking Metformin. Patient states she has Crohn's disease and the Metformin gives her diarrhea. Patient states she checks CBGs at home, states hypoglycemia at times. States she is on the road a lot and doesn't have a good diet or eating schedule.   Patient prescribed Lisinopril for her DM2, not HTN. Patient denies HTN, headaches, SOB, chest pain.   Patient prescribed Hydroxyzine for insomnia, states she hasn't picked up medication yet, awaiting her Humira so  she only has to make 1 trip to pharmacy.   Patient denies any other acute complaints.     Patient followed by GI Clinic. Last appointment on 2023. Crohn's disease of both small and large intestine without complication: History of enterovesicular, enteroenteric fistulas, and enterocutaneous fistulas s/p ileocecectomy in 2016. Endoscopic remission based on last endoscopies - last 2021 Rutgeerts i2a. Monotherapy for several years: Humira 40mg every week. Therapeutic trough. Continue current dose. Stop smoking. Recheck labs, CRP, and fecal calprotectin. Repeat colonoscopy in . Functional diarrhea: Consider discontinuation of metformin if diabetes treatment allows. Good diabetes control. Can use Imodium more regularly than prn, Discussed this with patient. Discussed other options including welchol which we have discussed in the past. Continue to refrain from dairy. Fecal calprotectin now. Patient has follow up appointment scheduled for 2024.     Patient followed by GYN Clinic. Last appointment on 2022. Patient has follow up appointment scheduled for 2023.     Review of patient's allergies indicates:   Allergen Reactions    Iodine Swelling, Itching and Shortness Of Breath     Other reaction(s): Swelling of throat    Latex Hives, Itching and Swelling    Shellfish containing products Swelling     Other reaction(s): Swelling of throat    Codeine Hives and Itching     Breast Cancer Screening: MMG ordered 2022  Cervical Cancer Screenin2022  Colorectal Cancer Screening: 10/29/2021  Diabetic Eye Exam: ordered 2023  Diabetic Foot Exam: 2023  Lung Cancer Screening: refused due to no insurance  Prostate Cancer Screening: N/A  AAA Screening: N/A  Osteoporosis Screening: deferred due to age  Medicare Wellness: N/A  Immunizations:   Immunization History   Administered Date(s) Administered    COVID-19, MRNA, LN-S, PF (Pfizer) (Gray Cap) 2022    COVID-19, MRNA,  LN-S, PF (Pfizer) (Purple Cap) 2021, 2021, 2021    Hepatitis A / Hepatitis B 2019, 10/29/2019    Influenza - Trivalent - PF (ADULT) 10/21/2021    PPD Test 11/10/2014    Pneumococcal Conjugate - 13 Valent 2019    Pneumococcal Polysaccharide - 23 Valent 10/29/2019    Tdap 10/09/2017    Zoster Recombinant 2019, 2021     Past Surgical History:   Procedure Laterality Date     SECTION      CHOLECYSTECTOMY      colon rescetion  10/14/2013    TONSILLECTOMY, ADENOIDECTOMY       family history includes Diabetes in her brother, father, mother, and sister; Hypertension in her father; Thyroid cancer in her mother.    Social History     Socioeconomic History    Marital status:    Tobacco Use    Smoking status: Every Day     Current packs/day: 1.00     Types: Cigarettes    Smokeless tobacco: Never   Substance and Sexual Activity    Alcohol use: Yes     Comment: ocassionally    Drug use: Never    Sexual activity: Yes     Partners: Male     Birth control/protection: Post-menopausal     Social Determinants of Health     Financial Resource Strain: Medium Risk (2023)    Overall Financial Resource Strain (CARDIA)     Difficulty of Paying Living Expenses: Somewhat hard   Food Insecurity: No Food Insecurity (2023)    Hunger Vital Sign     Worried About Running Out of Food in the Last Year: Never true     Ran Out of Food in the Last Year: Never true   Transportation Needs: No Transportation Needs (2023)    PRAPARE - Transportation     Lack of Transportation (Medical): No     Lack of Transportation (Non-Medical): No   Physical Activity: Sufficiently Active (2023)    Exercise Vital Sign     Days of Exercise per Week: 7 days     Minutes of Exercise per Session: 60 min   Stress: Stress Concern Present (2023)    [employer] - Occupational Stress Questionnaire     Feeling of Stress : Rather much   Social Connections: Socially Integrated  (8/16/2023)    Social Connection and Isolation Panel [NHANES]     Frequency of Communication with Friends and Family: More than three times a week     Frequency of Social Gatherings with Friends and Family: Once a week     Attends [employer] Services: More than 4 times per year     Active Member of Clubs or Organizations: Yes     Attends Club or Organization Meetings: More than 4 times per year     Marital Status:    Housing Stability: Low Risk  (8/16/2023)    Housing Stability Vital Sign     Unable to Pay for Housing in the Last Year: No     Number of Places Lived in the Last Year: 1     Unstable Housing in the Last Year: No     Current Outpatient Medications   Medication Instructions    biotin 5,000 mcg TbDL 1 tablet, Oral, Daily    ergocalciferol (ERGOCALCIFEROL) 50,000 Units, Oral, Every 7 days    ezetimibe (ZETIA) 10 mg, Oral, Daily    ferrous sulfate 324 mg, Oral, Every other day    glipiZIDE 5 mg, Oral, With breakfast    HUMIRA 40 mg, Subcutaneous, Every 7 days    hydrOXYzine HCL (ATARAX) 25 mg, Oral, Nightly PRN    lisinopriL 10 mg, Oral, Daily    metFORMIN (GLUCOPHAGE-XR) 1,000 mg, Oral, With breakfast    multivit with minerals/lutein (MULTIVITAMIN 50 PLUS ORAL) 1 tablet, Oral, Daily       Subjective:     Review of Systems   Constitutional: Negative.    HENT: Negative.     Eyes: Negative.    Respiratory: Negative.     Cardiovascular: Negative.    Gastrointestinal:  Positive for diarrhea.   Endocrine: Negative.    Genitourinary: Negative.    Musculoskeletal: Negative.    Skin: Negative.    Allergic/Immunologic: Negative.    Neurological: Negative.    Hematological: Negative.    Psychiatric/Behavioral: Negative.         Objective:     There were no vitals taken for this visit.    Physical Exam  Neurological:      Mental Status: She is alert and oriented to person, place, and time.   Psychiatric:         Mood and Affect: Mood normal.       Labs Reviewed:     Hematology:  Lab Results   Component Value  Date    WBC 10.40 08/16/2023    HGB 13.9 08/16/2023    HCT 43.0 08/16/2023     08/16/2023     Chemistry:  Lab Results   Component Value Date     08/16/2023    K 4.5 08/16/2023    CHLORIDE 107 08/16/2023    BUN 19.6 08/16/2023    CREATININE 1.00 08/16/2023    EGFRNORACEVR >60 08/16/2023    GLUCOSE 246 (H) 08/16/2023    CALCIUM 9.7 08/16/2023    ALKPHOS 75 08/16/2023    LABPROT 8.0 08/16/2023    ALBUMIN 4.2 08/16/2023    BILIDIR 0.2 11/22/2021    IBILI 0.40 11/22/2021    AST 14 08/16/2023    ALT 16 08/16/2023    [id number] 29.7 (L) 06/07/2023     Lab Results   Component Value Date    HGBA1C 9.9 (H) 08/16/2023     Lipid Panel:  Lab Results   Component Value Date    CHOL 262 (H) 08/16/2023    HDL 58 08/16/2023    .00 (H) 08/16/2023    TRIG 233 (H) 08/16/2023    TOTALCHOLEST 5 08/16/2023     Thyroid:  Lab Results   Component Value Date    TSH 1.555 08/16/2023    T3FREE 3.06 05/30/2018     Urine:  Lab Results   Component Value Date    APPEARANCEUA Clear 03/19/2021    PROTEINUA 20 (A) 03/19/2021    LEUKOCYTESUR Negative 03/19/2021    RBCUA 0-2 03/19/2021    WBCUA 3-5 (A) 03/19/2021    BACTERIA Few 03/19/2021    SQEPUA >100 (A) 03/19/2021    HYALINECASTS 0-2 (A) 03/19/2021    CREATRANDUR 195.2 (H) 03/19/2021    PROTEINURINE 28.7 10/29/2019        Assessment:       ICD-10-CM ICD-9-CM   1. Type 2 diabetes mellitus with hyperglycemia, without long-term current use of insulin  E11.65 250.00     790.29   2. Hyperlipidemia, unspecified hyperlipidemia type  E78.5 272.4   3. Vitamin D deficiency  E55.9 268.9   4. Crohn's disease of both small and large intestine without complication  K50.80 555.2   5. Statin intolerance  Z78.9 995.27       Plan:     1. Type 2 diabetes mellitus with hyperglycemia, without long-term current use of insulin  Change Metformin to Metformin ER 1,000 mg daily  Start Glipizide XL 5 mg daily  Encouraged home CBG monitoring.  Hypoglycemic episodes: denies  There is no height or weight on  file to calculate BMI.  Hemoglobin A1c   Date Value Ref Range Status   08/16/2023 9.9 (H) <=7.0 % Final     Results for orders placed or performed in visit on 03/19/21   Microalbumin/Creatinine Ratio, Urine   Result Value Ref Range    Microalbumin Creatinine Ratio 6.6 0.0 - 30.0 mg/gm Cr    Urine Creatinine 195.2 (H) 45.0 - 106.0 mg/dL    Urine Microalbumin 12.9 <<=30.0 mg/L   Statin Intolerance  On Lisinopril  Weight Loss Encouraged  ADA Diet  - Comprehensive Metabolic Panel; Future  - Hemoglobin A1C; Future    2. Hyperlipidemia, unspecified hyperlipidemia type  Statin Intolerance (severe leg pain)  Start Zetia 10 mg daily  Weight loss encouraged  Low fat/high fiber diet  Increase physical activity  Tobacco cessation encouraged  Cholesterol Total   Date Value Ref Range Status   08/16/2023 262 (H) <=200 mg/dL Final     HDL Cholesterol   Date Value Ref Range Status   08/16/2023 58 35 - 60 mg/dL Final     Triglyceride   Date Value Ref Range Status   08/16/2023 233 (H) 37 - 140 mg/dL Final     LDL Cholesterol   Date Value Ref Range Status   08/16/2023 157.00 (H) 50.00 - 140.00 mg/dL Final   - Lipid Panel; Future    3. Vitamin D deficiency  Vitamin D level: 29.7  Continue Vitamin D 50,000 units weekly    4. Crohn's disease of both small and large intestine without complication  Followed by GI    5. Statin intolerance  Severe leg pain      Follow up in about 3 months (around 11/23/2023) for Labs. In addition to their scheduled follow up, the patient has also been instructed to follow up on as needed basis.     GLADYS Stein

## 2023-10-18 ENCOUNTER — CLINICAL SUPPORT (OUTPATIENT)
Dept: INTERNAL MEDICINE | Facility: CLINIC | Age: 58
End: 2023-10-18

## 2023-10-18 DIAGNOSIS — Z23 NEEDS FLU SHOT: Primary | ICD-10-CM

## 2023-10-18 PROCEDURE — 90686 IIV4 VACC NO PRSV 0.5 ML IM: CPT | Mod: PBBFAC

## 2023-10-18 PROCEDURE — 99211 OFF/OP EST MAY X REQ PHY/QHP: CPT | Mod: PBBFAC

## 2023-10-18 PROCEDURE — 90471 IMMUNIZATION ADMIN: CPT | Mod: PBBFAC

## 2023-10-18 RX ADMIN — INFLUENZA VIRUS VACCINE 0.5 ML: 15; 15; 15; 15 SUSPENSION INTRAMUSCULAR at 10:10

## 2023-10-18 NOTE — PROGRESS NOTES
"  Here  for flu vaccine. No fever temp 97.9.  Given  in left deltoid. Tolerated well..  VIS handout  given.Refused to wait her 15 minutes. She said " I have to get back to work".  "

## 2023-12-19 DIAGNOSIS — Z12.31 ENCOUNTER FOR SCREENING MAMMOGRAM FOR MALIGNANT NEOPLASM OF BREAST: Primary | ICD-10-CM

## 2024-05-24 RX ORDER — EZETIMIBE 10 MG/1
10 TABLET ORAL DAILY
Qty: 90 TABLET | Refills: 1 | OUTPATIENT
Start: 2024-05-24

## 2024-05-28 DIAGNOSIS — E11.9 TYPE 2 DIABETES MELLITUS WITHOUT COMPLICATION, WITHOUT LONG-TERM CURRENT USE OF INSULIN: Primary | ICD-10-CM

## 2024-05-28 DIAGNOSIS — I10 ESSENTIAL HYPERTENSION: ICD-10-CM

## 2024-05-28 RX ORDER — GLIPIZIDE 5 MG/1
5 TABLET, FILM COATED, EXTENDED RELEASE ORAL
Qty: 30 TABLET | Refills: 0 | Status: SHIPPED | OUTPATIENT
Start: 2024-05-28 | End: 2024-06-19 | Stop reason: SDUPTHER

## 2024-05-28 RX ORDER — LISINOPRIL 10 MG/1
10 TABLET ORAL DAILY
Qty: 30 TABLET | Refills: 0 | Status: SHIPPED | OUTPATIENT
Start: 2024-05-28

## 2024-05-28 RX ORDER — EZETIMIBE 10 MG/1
10 TABLET ORAL DAILY
Qty: 30 TABLET | Refills: 0 | Status: SHIPPED | OUTPATIENT
Start: 2024-05-28 | End: 2024-06-19 | Stop reason: SDUPTHER

## 2024-05-28 NOTE — TELEPHONE ENCOUNTER
----- Message from Jp Tang sent at 5/28/2024  9:54 AM CDT -----  .Type:  RX Refill Request    Who Called: pt   Refill or New Rx:refill   RX Name and Strength:glipiZIDE 5 MG TR24  How is the patient currently taking it? (ex. 1XDay):1x  Is this a 30 day or 90 day RX:90  Preferred Pharmacy with phone number:[address]  Local or Mail Order:local   Ordering Provider:bhavin   Would the patient rather a call back or a response via [employer]? Call back   Best Call Back Number:[phone]  Additional Information:         .Type:  RX Refill Request    Who Called: pt   Refill or New Rx:refill   RX Name and Strength:lisinopriL 10 MG tablet  How is the patient currently taking it? (ex. 1XDay) 1x  Is this a 30 day or 90 day RX:90  Preferred Pharmacy with phone number:[address]  Local or Mail Order:local   Ordering Provider:bhavin   Would the patient rather a call back or a response via [employer]? Call back   Best Call Back Number:[phone]  Additional Information:       .Type:  RX Refill Request    Who Called: pt   Refill or New Rx:refill   RX Name and Strength:ezetimibe (ZETIA) 10 mg tablet  How is the patient currently taking it? (ex. 1XDay) 1x  Is this a 30 day or 90 day RX:90  Preferred Pharmacy with phone number:[address]  Local or Mail Order:local   Ordering Provider:bhavin   Would the patient rather a call back or a response via [employer]? Call back   Best Call Back Number:[phone]  Additional Information:

## 2024-06-12 ENCOUNTER — LAB VISIT (OUTPATIENT)
Dept: LAB | Facility: HOSPITAL | Age: 59
End: 2024-06-12
Attending: NURSE PRACTITIONER

## 2024-06-12 ENCOUNTER — OFFICE VISIT (OUTPATIENT)
Dept: INTERNAL MEDICINE | Facility: CLINIC | Age: 59
End: 2024-06-12

## 2024-06-12 ENCOUNTER — CLINICAL SUPPORT (OUTPATIENT)
Dept: INTERNAL MEDICINE | Facility: CLINIC | Age: 59
End: 2024-06-12
Attending: NURSE PRACTITIONER

## 2024-06-12 VITALS
HEART RATE: 69 BPM | WEIGHT: 167.19 LBS | TEMPERATURE: 98 F | BODY MASS INDEX: 28.54 KG/M2 | SYSTOLIC BLOOD PRESSURE: 109 MMHG | DIASTOLIC BLOOD PRESSURE: 70 MMHG | HEIGHT: 64 IN | RESPIRATION RATE: 18 BRPM

## 2024-06-12 DIAGNOSIS — Z78.9 STATIN INTOLERANCE: Chronic | ICD-10-CM

## 2024-06-12 DIAGNOSIS — M25.551 RIGHT HIP PAIN: ICD-10-CM

## 2024-06-12 DIAGNOSIS — K50.80 CROHN'S DISEASE OF BOTH SMALL AND LARGE INTESTINE WITHOUT COMPLICATION: Chronic | ICD-10-CM

## 2024-06-12 DIAGNOSIS — E11.65 TYPE 2 DIABETES MELLITUS WITH HYPERGLYCEMIA, WITHOUT LONG-TERM CURRENT USE OF INSULIN: ICD-10-CM

## 2024-06-12 DIAGNOSIS — Z13.5 DIABETIC RETINOPATHY SCREENING: Primary | ICD-10-CM

## 2024-06-12 DIAGNOSIS — E11.65 TYPE 2 DIABETES MELLITUS WITH HYPERGLYCEMIA, WITHOUT LONG-TERM CURRENT USE OF INSULIN: Primary | Chronic | ICD-10-CM

## 2024-06-12 DIAGNOSIS — Z13.5 DIABETIC RETINOPATHY SCREENING: ICD-10-CM

## 2024-06-12 DIAGNOSIS — F17.210 CIGARETTE NICOTINE DEPENDENCE WITHOUT COMPLICATION: Chronic | ICD-10-CM

## 2024-06-12 DIAGNOSIS — E11.9 TYPE 2 DIABETES MELLITUS WITHOUT COMPLICATION, WITHOUT LONG-TERM CURRENT USE OF INSULIN: ICD-10-CM

## 2024-06-12 LAB
ALBUMIN SERPL-MCNC: 3.7 G/DL (ref 3.4–4.8)
ALBUMIN/GLOB SERPL: 1 RATIO (ref 1.1–2)
ALP SERPL-CCNC: 74 UNIT/L (ref 40–150)
ALT SERPL-CCNC: 15 UNIT/L (ref 0–55)
ANION GAP SERPL CALC-SCNC: 11 MEQ/L
AST SERPL-CCNC: 18 UNIT/L (ref 5–34)
BACTERIA #/AREA URNS AUTO: ABNORMAL /HPF
BASOPHILS # BLD AUTO: 0.08 X10(3)/MCL
BASOPHILS NFR BLD AUTO: 0.6 %
BILIRUB SERPL-MCNC: 0.5 MG/DL
BILIRUB UR QL STRIP.AUTO: NEGATIVE
BUN SERPL-MCNC: 14.4 MG/DL (ref 9.8–20.1)
CALCIUM SERPL-MCNC: 9.3 MG/DL (ref 8.4–10.2)
CHLORIDE SERPL-SCNC: 107 MMOL/L (ref 98–107)
CHOLEST SERPL-MCNC: 210 MG/DL
CHOLEST/HDLC SERPL: 4 {RATIO} (ref 0–5)
CLARITY UR: CLEAR
CO2 SERPL-SCNC: 22 MMOL/L (ref 23–31)
COLOR UR AUTO: YELLOW
CREAT SERPL-MCNC: 0.93 MG/DL (ref 0.55–1.02)
CREAT UR-MCNC: 262 MG/DL (ref 45–106)
CREAT/UREA NIT SERPL: 15
EOSINOPHIL # BLD AUTO: 0.23 X10(3)/MCL (ref 0–0.9)
EOSINOPHIL NFR BLD AUTO: 1.8 %
ERYTHROCYTE [DISTWIDTH] IN BLOOD BY AUTOMATED COUNT: 12.8 % (ref 11.5–17)
EST. AVERAGE GLUCOSE BLD GHB EST-MCNC: 234.6 MG/DL
GFR SERPLBLD CREATININE-BSD FMLA CKD-EPI: >60 ML/MIN/1.73/M2
GLOBULIN SER-MCNC: 3.6 GM/DL (ref 2.4–3.5)
GLUCOSE SERPL-MCNC: 164 MG/DL (ref 82–115)
GLUCOSE UR QL STRIP: ABNORMAL
HBA1C MFR BLD: 9.8 %
HCT VFR BLD AUTO: 40.6 % (ref 37–47)
HDLC SERPL-MCNC: 51 MG/DL (ref 35–60)
HGB BLD-MCNC: 13.5 G/DL (ref 12–16)
HGB UR QL STRIP: NEGATIVE
HYALINE CASTS #/AREA URNS LPF: ABNORMAL /LPF
IMM GRANULOCYTES # BLD AUTO: 0.03 X10(3)/MCL (ref 0–0.04)
IMM GRANULOCYTES NFR BLD AUTO: 0.2 %
KETONES UR QL STRIP: ABNORMAL
LDLC SERPL CALC-MCNC: 101 MG/DL (ref 50–140)
LEUKOCYTE ESTERASE UR QL STRIP: 25
LYMPHOCYTES # BLD AUTO: 3.9 X10(3)/MCL (ref 0.6–4.6)
LYMPHOCYTES NFR BLD AUTO: 30.8 %
MCH RBC QN AUTO: 31.1 PG (ref 27–31)
MCHC RBC AUTO-ENTMCNC: 33.3 G/DL (ref 33–36)
MCV RBC AUTO: 93.5 FL (ref 80–94)
MICROALBUMIN UR-MCNC: 17.3 UG/ML
MICROALBUMIN/CREAT RATIO PNL UR: 6.6 MG/GM CR (ref 0–30)
MONOCYTES # BLD AUTO: 0.57 X10(3)/MCL (ref 0.1–1.3)
MONOCYTES NFR BLD AUTO: 4.5 %
MUCOUS THREADS URNS QL MICRO: ABNORMAL /LPF
NEUTROPHILS # BLD AUTO: 7.84 X10(3)/MCL (ref 2.1–9.2)
NEUTROPHILS NFR BLD AUTO: 62.1 %
NITRITE UR QL STRIP: ABNORMAL
NRBC BLD AUTO-RTO: 0 %
PH UR STRIP: 5.5 [PH]
PLATELET # BLD AUTO: 390 X10(3)/MCL (ref 130–400)
PMV BLD AUTO: 10 FL (ref 7.4–10.4)
POTASSIUM SERPL-SCNC: 4.1 MMOL/L (ref 3.5–5.1)
PROT SERPL-MCNC: 7.3 GM/DL (ref 5.8–7.6)
PROT UR QL STRIP: ABNORMAL
RBC # BLD AUTO: 4.34 X10(6)/MCL (ref 4.2–5.4)
RBC #/AREA URNS AUTO: ABNORMAL /HPF
SODIUM SERPL-SCNC: 140 MMOL/L (ref 136–145)
SP GR UR STRIP.AUTO: 1.03 (ref 1–1.03)
SQUAMOUS #/AREA URNS LPF: ABNORMAL /HPF
TRIGL SERPL-MCNC: 290 MG/DL (ref 37–140)
UROBILINOGEN UR STRIP-ACNC: NORMAL
VLDLC SERPL CALC-MCNC: 58 MG/DL
WBC # SPEC AUTO: 12.65 X10(3)/MCL (ref 4.5–11.5)
WBC #/AREA URNS AUTO: ABNORMAL /HPF

## 2024-06-12 PROCEDURE — 92228 IMG RTA DETC/MNTR DS PHY/QHP: CPT | Mod: PBBFAC | Performed by: NURSE PRACTITIONER

## 2024-06-12 PROCEDURE — 99406 BEHAV CHNG SMOKING 3-10 MIN: CPT | Mod: S$PBB,,, | Performed by: NURSE PRACTITIONER

## 2024-06-12 PROCEDURE — 36415 COLL VENOUS BLD VENIPUNCTURE: CPT

## 2024-06-12 PROCEDURE — 85025 COMPLETE CBC W/AUTO DIFF WBC: CPT

## 2024-06-12 PROCEDURE — 80061 LIPID PANEL: CPT

## 2024-06-12 PROCEDURE — 83036 HEMOGLOBIN GLYCOSYLATED A1C: CPT

## 2024-06-12 PROCEDURE — 80053 COMPREHEN METABOLIC PANEL: CPT

## 2024-06-12 PROCEDURE — 99214 OFFICE O/P EST MOD 30 MIN: CPT | Mod: PBBFAC,25 | Performed by: NURSE PRACTITIONER

## 2024-06-12 PROCEDURE — 96372 THER/PROPH/DIAG INJ SC/IM: CPT | Mod: PBBFAC

## 2024-06-12 PROCEDURE — 81015 MICROSCOPIC EXAM OF URINE: CPT

## 2024-06-12 PROCEDURE — 99214 OFFICE O/P EST MOD 30 MIN: CPT | Mod: 25,S$PBB,, | Performed by: NURSE PRACTITIONER

## 2024-06-12 PROCEDURE — 82570 ASSAY OF URINE CREATININE: CPT

## 2024-06-12 PROCEDURE — 92228 IMG RTA DETC/MNTR DS PHY/QHP: CPT | Mod: TC,PBBFAC

## 2024-06-12 RX ORDER — ADALIMUMAB 40MG/0.8ML
40 KIT SUBCUTANEOUS
COMMUNITY
Start: 2024-05-24

## 2024-06-12 RX ORDER — KETOROLAC TROMETHAMINE 30 MG/ML
30 INJECTION, SOLUTION INTRAMUSCULAR; INTRAVENOUS ONCE
Status: COMPLETED | OUTPATIENT
Start: 2024-06-12 | End: 2024-06-12

## 2024-06-12 RX ADMIN — KETOROLAC TROMETHAMINE 30 MG: 30 INJECTION, SOLUTION INTRAMUSCULAR; INTRAVENOUS at 02:06

## 2024-06-12 NOTE — PROGRESS NOTES
Patient ID: [id number]     Chief Complaint: Diabetes and Follow-up    HPI:     Janel Richmond is a 58 y.o. female with diagnosis of DM2, Crohn's Disease. Patient seen today for follow up. Patient last seen in clinic on 08/23/2023.   At previous appt, Metformin changed to ER 1,000 mg daily and Glipizide changed to XL 5 mg daily for DM2. Patient states she checks CBGs at home, states hypoglycemia at times. Patient states taking medications as prescribed.   Patient prescribed Lisinopril for her DM2, not HTN. Patient denies HTN, headaches, SOB, chest pain.   Today, patient states right hip pain. Denies injury. Patient requesting steroid injection however patient has uncontrolled DM2 and labs pending.   Patient denies any other acute complaints.     Patient followed by GI Clinic. Last appointment on 07/11/2023. 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 Endoscopic remission based on last endoscopies - last October 2021 Rutgeerts i2a. Monotherapy for several years: Humira 40mg every week. Therapeutic trough. Continue current dose. Stop smoking. Recheck labs, CRP, and fecal calprotectin. Repeat colonoscopy in 2024. Functional diarrhea: Consider discontinuation of metformin if diabetes treatment allows. Good diabetes control. Can use Imodium more regularly than prn, Discussed this with patient. Discussed other options including welchol which we have discussed in the past. Continue to refrain from dairy. Fecal calprotectin now. Patient has follow up appointment scheduled for 01/16/2024.     Patient followed by GYN Clinic. Last appointment on 09/21/2022. Patient has follow up appointment scheduled for 09/22/2023.     Review of patient's allergies indicates:   Allergen Reactions    Iodine Swelling, Itching and Shortness Of Breath     Other reaction(s): Swelling of throat    Latex Hives,  Itching and Swelling    Shellfish containing products Swelling     Other reaction(s): Swelling of throat    Codeine Hives and Itching     Breast Cancer Screening: [facility] ordered 2022  Cervical Cancer Screenin2022  Colorectal Cancer Screening: 10/29/2021  Diabetic Eye Exam: 2024  Diabetic Foot Exam: 2023  Lung Cancer Screening: refused due to no insurance  Prostate Cancer Screening: N/A  AAA Screening: N/A  Osteoporosis Screening: deferred due to age  Medicare Wellness: N/A  Immunizations:   Immunization History   Administered Date(s) Administered    COVID-19, MRNA, LN-S, PF (Pfizer) (Gray Cap) 2022    COVID-19, MRNA, LN-S, PF (Pfizer) (Purple Cap) 2021, 2021, 2021    Hepatitis A / Hepatitis B 2019, 10/29/2019    Influenza - Quadrivalent - PF *Preferred* (6 months and older) 10/18/2023    Influenza - Trivalent - PF (ADULT) 10/21/2021    PPD Test 11/10/2014    Pneumococcal Conjugate - 13 Valent 2019    Pneumococcal Polysaccharide - 23 Valent 10/29/2019    Tdap 10/09/2017    Zoster Recombinant 2019, 2021     Past Surgical History:   Procedure Laterality Date     SECTION      CHOLECYSTECTOMY      colon rescetion  10/14/2013    TONSILLECTOMY, ADENOIDECTOMY       family history includes Diabetes in her brother, father, mother, and sister; Hypertension in her father; Thyroid cancer in her mother.    Social History     Socioeconomic History    Marital status:    Tobacco Use    Smoking status: Every Day     Current packs/day: 1.00     Types: Cigarettes    Smokeless tobacco: Never   Substance and Sexual Activity    Alcohol use: Yes     Comment: ocassionally    Drug use: Never    Sexual activity: Yes     Partners: Male     Birth control/protection: Post-menopausal     Social Determinants of Health     Financial Resource Strain: Medium Risk (2023)    Overall Financial Resource Strain (CARDIA)     Difficulty of Paying Living Expenses:  Somewhat hard   Food Insecurity: No Food Insecurity (8/16/2023)    Hunger Vital Sign     Worried About Running Out of Food in the Last Year: Never true     Ran Out of Food in the Last Year: Never true   Transportation Needs: No Transportation Needs (8/16/2023)    PRAPARE - Transportation     Lack of Transportation (Medical): No     Lack of Transportation (Non-Medical): No   Physical Activity: Sufficiently Active (8/16/2023)    Exercise Vital Sign     Days of Exercise per Week: 7 days     Minutes of Exercise per Session: 60 min   Stress: Stress Concern Present (8/16/2023)    [employer] - Occupational Stress Questionnaire     Feeling of Stress : Rather much   Housing Stability: Low Risk  (8/16/2023)    Housing Stability Vital Sign     Unable to Pay for Housing in the Last Year: No     Number of Places Lived in the Last Year: 1     Unstable Housing in the Last Year: No     Current Outpatient Medications   Medication Instructions    biotin 5,000 mcg TbDL 1 tablet, Oral, Daily    ezetimibe (ZETIA) 10 mg, Oral, Daily    glipiZIDE (GLUCOTROL) 10 mg, Oral, With breakfast    HUMIRA PEN 40 mg, Subcutaneous, Every 7 days    hydrOXYzine HCL (ATARAX) 25 mg, Oral, Nightly PRN    lisinopriL 10 mg, Oral, Daily    metFORMIN (GLUCOPHAGE-XR) 1,000 mg, Oral, With breakfast    multivit with minerals/lutein (MULTIVITAMIN 50 PLUS ORAL) 1 tablet, Oral, Daily       Subjective:     Review of Systems   Constitutional: Negative.    HENT: Negative.     Eyes: Negative.    Respiratory: Negative.     Cardiovascular: Negative.    Gastrointestinal: Negative.    Endocrine: Negative.    Genitourinary: Negative.    Musculoskeletal:  Positive for arthralgias.   Skin: Negative.    Allergic/Immunologic: Negative.    Neurological: Negative.    Hematological: Negative.    Psychiatric/Behavioral: Negative.         Objective:     Visit Vitals  /70 (BP Location: Left arm, Patient Position: Sitting, BP Method: Medium  "(Automatic))   Pulse 69   Temp 98.1 °F (36.7 °C) (Oral)   Resp 18   Ht 5' 4.02" (1.626 m)   Wt 75.8 kg (167 lb 3.2 oz)   BMI 28.69 kg/m²       Physical Exam  Vitals reviewed.   Constitutional:       Appearance: Normal appearance.   HENT:      Head: Normocephalic and atraumatic.      Mouth/Throat:      Mouth: Mucous membranes are moist.      Pharynx: Oropharynx is clear.   Eyes:      Extraocular Movements: Extraocular movements intact.      Conjunctiva/sclera: Conjunctivae normal.      Pupils: Pupils are equal, round, and reactive to light.   Cardiovascular:      Rate and Rhythm: Normal rate and regular rhythm.      Heart sounds: Normal heart sounds.   Pulmonary:      Effort: Pulmonary effort is normal.      Breath sounds: Normal breath sounds.   Abdominal:      General: Bowel sounds are normal.   Musculoskeletal:         General: Normal range of motion.      Cervical back: Normal range of motion.   Skin:     General: Skin is warm and dry.   Neurological:      Mental Status: She is alert and oriented to person, place, and time.   Psychiatric:         Mood and Affect: Mood normal.         Behavior: Behavior normal.       Labs Reviewed:     Hematology:  Lab Results   Component Value Date    WBC 12.65 (H) 06/12/2024    HGB 13.5 06/12/2024    HCT 40.6 06/12/2024     06/12/2024     Chemistry:  Lab Results   Component Value Date     06/12/2024    K 4.1 06/12/2024    BUN 14.4 06/12/2024    CREATININE 0.93 06/12/2024    EGFRNORACEVR >60 06/12/2024    GLUCOSE 164 (H) 06/12/2024    CALCIUM 9.3 06/12/2024    ALKPHOS 74 06/12/2024    LABPROT 7.3 06/12/2024    ALBUMIN 3.7 06/12/2024    BILIDIR 0.2 11/22/2021    IBILI 0.40 11/22/2021    AST 18 06/12/2024    ALT 15 06/12/2024    [id number] 29.7 (L) 06/07/2023     Lab Results   Component Value Date    HGBA1C 9.8 (H) 06/12/2024     Lipid Panel:  Lab Results   Component Value Date    CHOL 210 (H) 06/12/2024    HDL 51 06/12/2024    .00 06/12/2024    TRIG 290 (H) " 06/12/2024    TOTALCHOLEST 4 06/12/2024     Thyroid:  Lab Results   Component Value Date    TSH 1.555 08/16/2023    T3FREE 3.06 05/30/2018     Urine:  Lab Results   Component Value Date    APPEARANCEUA Clear 06/12/2024    SGUA 1.032 (H) 06/12/2024    PROTEINUA 1+ (A) 06/12/2024    KETONESUA Trace (A) 06/12/2024    LEUKOCYTESUR 25 (A) 06/12/2024    RBCUA 0-5 06/12/2024    WBCUA 0-5 06/12/2024    BACTERIA Many (A) 06/12/2024    SQEPUA Moderate (A) 06/12/2024    HYALINECASTS 6-10 (A) 06/12/2024    CREATRANDUR 262.0 (H) 06/12/2024    PROTEINURINE 28.7 10/29/2019        Assessment:       ICD-10-CM ICD-9-CM   1. Type 2 diabetes mellitus with hyperglycemia, without long-term current use of insulin  E11.65 250.00     790.29   2. Right hip pain  M25.551 719.45   3. Crohn's disease of both small and large intestine without complication  K50.80 555.2   4. Statin intolerance  Z78.9 995.27   5. Cigarette nicotine dependence without complication  F17.210 305.1   6. Diabetic retinopathy screening  Z13.5 V80.2   7. Type 2 diabetes mellitus without complication, without long-term current use of insulin  E11.9 250.00       Plan:     1. Type 2 diabetes mellitus with hyperglycemia, without long-term current use of insulin  Continue Metformin ER 1,000 mg daily  Increase Glipizide XL to 10 mg daily  Encouraged home CBG monitoring.  Hypoglycemic episodes: denies  Body mass index is 28.69 kg/m².  Hemoglobin A1c   Date Value Ref Range Status   06/12/2024 9.8 (H) <=7.0 % Final     Results for orders placed or performed in visit on 03/19/21   Microalbumin/Creatinine Ratio, Urine   Result Value Ref Range    Microalbumin Creatinine Ratio 6.6 0.0 - 30.0 mg/gm Cr    Urine Creatinine 195.2 (H) 45.0 - 106.0 mg/dL    Urine Microalbumin 12.9 <<=30.0 mg/L   Statin Intolerance  On Lisinopril  Weight Loss Encouraged  ADA Diet  - CBC Auto Differential; Future  - Comprehensive Metabolic Panel; Future  - Hemoglobin A1C; Future  - Lipid Panel; Future  -  Urinalysis; Future  - Microalbumin/Creatinine Ratio, Urine; Future    2. Right hip pain  Toradol injection today    3. Crohn's disease of both small and large intestine without complication  Followed by GI    4. Statin intolerance  On Zetia    5. Cigarette nicotine dependence without complication  Smoking cessation education provided, encouraged. Informed of smoking cessation program. Patient refused smoking cessation at this time. I spent 3 minutes discussing smoking cessation with patient.     6. Diabetic retinopathy screening  - Diabetic Eye Screening Photo      Follow up in about 3 months (around 9/12/2024) for Labs. In addition to their scheduled follow up, the patient has also been instructed to follow up on as needed basis.     Viv Lipscomb, EMIP

## 2024-06-12 NOTE — PROGRESS NOTES
Janel Richmond is a 58 y.o. female here for a diabetic eye screening with non-dilated fundus photos per ,GLADYS Kumar.    Patient cooperative?: Yes  Small pupils?: No  Last eye exam: unknown    For exam results, see Encounter Report.

## 2024-06-19 ENCOUNTER — TELEPHONE (OUTPATIENT)
Dept: INTERNAL MEDICINE | Facility: CLINIC | Age: 59
End: 2024-06-19

## 2024-06-19 RX ORDER — METFORMIN HYDROCHLORIDE 500 MG/1
1000 TABLET, EXTENDED RELEASE ORAL
Qty: 180 TABLET | Refills: 1 | Status: SHIPPED | OUTPATIENT
Start: 2024-06-19

## 2024-06-19 RX ORDER — EZETIMIBE 10 MG/1
10 TABLET ORAL DAILY
Qty: 90 TABLET | Refills: 1 | Status: SHIPPED | OUTPATIENT
Start: 2024-06-19

## 2024-06-19 RX ORDER — GLIPIZIDE 10 MG/1
10 TABLET, FILM COATED, EXTENDED RELEASE ORAL
Qty: 90 TABLET | Refills: 1 | Status: SHIPPED | OUTPATIENT
Start: 2024-06-19

## 2024-06-19 NOTE — TELEPHONE ENCOUNTER
Please notify patient that A1c elevated. Glipizide increased to 10 mg and Metformin continued at 1,000 mg daily.

## 2024-07-23 ENCOUNTER — OFFICE VISIT (OUTPATIENT)
Dept: GASTROENTEROLOGY | Facility: CLINIC | Age: 59
End: 2024-07-23

## 2024-07-23 VITALS
HEART RATE: 87 BPM | HEIGHT: 64 IN | TEMPERATURE: 98 F | RESPIRATION RATE: 12 BRPM | SYSTOLIC BLOOD PRESSURE: 126 MMHG | BODY MASS INDEX: 28.24 KG/M2 | WEIGHT: 165.38 LBS | DIASTOLIC BLOOD PRESSURE: 76 MMHG

## 2024-07-23 DIAGNOSIS — K50.80 CROHN'S DISEASE OF BOTH SMALL AND LARGE INTESTINE WITHOUT COMPLICATION: Primary | Chronic | ICD-10-CM

## 2024-07-23 DIAGNOSIS — F17.210 CIGARETTE NICOTINE DEPENDENCE WITHOUT COMPLICATION: Chronic | ICD-10-CM

## 2024-07-23 DIAGNOSIS — K59.1 FUNCTIONAL DIARRHEA: ICD-10-CM

## 2024-07-23 LAB
25(OH)D3+25(OH)D2 SERPL-MCNC: 25 NG/ML (ref 30–80)
CRP SERPL-MCNC: 11.7 MG/L
FERRITIN SERPL-MCNC: 20.93 NG/ML (ref 4.63–204)
FOLATE SERPL-MCNC: 11.3 NG/ML (ref 7–31.4)
HAV AB SER QL IA: REACTIVE
HBV SURFACE AB SER-ACNC: 18.73 MIU/ML
HBV SURFACE AB SERPL IA-ACNC: REACTIVE M[IU]/ML
HBV SURFACE AG SERPL QL IA: NONREACTIVE
IRON SATN MFR SERPL: 22 % (ref 20–50)
IRON SERPL-MCNC: 87 UG/DL (ref 50–170)
TIBC SERPL-MCNC: 305 UG/DL (ref 70–310)
TIBC SERPL-MCNC: 392 UG/DL (ref 250–450)
TRANSFERRIN SERPL-MCNC: 356 MG/DL (ref 180–382)
VIT B12 SERPL-MCNC: 480 PG/ML (ref 213–816)

## 2024-07-23 PROCEDURE — 36415 COLL VENOUS BLD VENIPUNCTURE: CPT | Performed by: INTERNAL MEDICINE

## 2024-07-23 PROCEDURE — 99214 OFFICE O/P EST MOD 30 MIN: CPT | Mod: PBBFAC | Performed by: INTERNAL MEDICINE

## 2024-07-23 PROCEDURE — 86480 TB TEST CELL IMMUN MEASURE: CPT | Performed by: INTERNAL MEDICINE

## 2024-07-23 PROCEDURE — 86708 HEPATITIS A ANTIBODY: CPT | Performed by: INTERNAL MEDICINE

## 2024-07-23 PROCEDURE — 86140 C-REACTIVE PROTEIN: CPT | Performed by: INTERNAL MEDICINE

## 2024-07-23 PROCEDURE — 82306 VITAMIN D 25 HYDROXY: CPT | Performed by: INTERNAL MEDICINE

## 2024-07-23 PROCEDURE — 82746 ASSAY OF FOLIC ACID SERUM: CPT | Performed by: INTERNAL MEDICINE

## 2024-07-23 PROCEDURE — 83540 ASSAY OF IRON: CPT | Performed by: INTERNAL MEDICINE

## 2024-07-23 PROCEDURE — 83550 IRON BINDING TEST: CPT | Performed by: INTERNAL MEDICINE

## 2024-07-23 PROCEDURE — 86706 HEP B SURFACE ANTIBODY: CPT | Performed by: INTERNAL MEDICINE

## 2024-07-23 PROCEDURE — 84630 ASSAY OF ZINC: CPT | Performed by: INTERNAL MEDICINE

## 2024-07-23 PROCEDURE — 82728 ASSAY OF FERRITIN: CPT | Performed by: INTERNAL MEDICINE

## 2024-07-23 PROCEDURE — 87340 HEPATITIS B SURFACE AG IA: CPT | Performed by: INTERNAL MEDICINE

## 2024-07-23 PROCEDURE — 82607 VITAMIN B-12: CPT | Performed by: INTERNAL MEDICINE

## 2024-07-23 RX ORDER — POLYETHYLENE GLYCOL 3350, SODIUM SULFATE, SODIUM CHLORIDE, POTASSIUM CHLORIDE, SODIUM ASCORBATE, AND ASCORBIC ACID 7.5-2.691G
KIT ORAL
Qty: 1 KIT | Refills: 0 | Status: SHIPPED | OUTPATIENT
Start: 2024-07-23

## 2024-07-23 NOTE — PROGRESS NOTES
"                               Inflammatory Bowel Disease        Established Patient Note         TODAY'S VISIT DATE:  07/11/2023  The patient's last visit with me was on Visit date not found.     PCP: Viv Lipscomb      Referring MD:   No ref. provider found    History of Present Illness:    Ms. Richmond is a 58 year old female with small bowel fistulizing Crohn's disease s/p ileocecectomy in 2016 on Humira 40mg weekly for follow-up.    She was diagnosed with Crohn's disease around 1996 when she presented with nausea, vomiting, abdominal pain. She had a surgical procedure to "remove an infection." She recalls being placed on steroids and at one point Asacol without significant improvement. She also remembers being briefly on Remicade at one point. She moved to [place] and was seen by a gastroenterologist there. I do not have any records of what transpired during her time in [place]. She moved back and went several years without seeing a gastroenterologist due to insurance reasons.    In November 2014 she was seen in GI clinic here. Prior to that she had presented to the emergency department several times with abdominal pain. CT scan in 2014 described severe mural thickening and mesenteric fat stranding in the right lower quadrant involving the terminal ileum, cecum, descending colon and to a lesser degree the transverse colon. There was evidence of a colovesicular fistula, enteroenteric fistula between the sigmoid and cecum and between the ileum and cecum. 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 She was initially given steroids and sulfasalazine. On January 19, 2015, she had a colonoscopy that showed an inflammatory mass around the area of the ileocecal valve and cecum, possible fistula in the right side of the colon, normal ascending, transverse and descending colon, sigmoid diverticulosis along with small polyps not resected in the sigmoid. Pathology report of the right colon mass showed " severe chronic inflammation and biopsies of the sigmoid colon polyp showed mild chronic inflammation.    She was started on Humira 40 mg every other week for maintenance. 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 On October 14, 2016, she underwent an exploratory laparotomy, small bowel resection with ileocecectomy, ileocolonic anastomosis, and takedown of an enterosigmoid fistula with primary repair of sigmoid defect.    Following resection she was started back on Humira 40 mg every other week. She's had chronic diarrhea since resection.     February 2019 colonoscopy showed Rutgeerts i2a disease with ulcerations on IC anastomosis and one aphthous ulcer in the neoti.   Feb 2019: fecal calprotectin: <16  Feb 2019 ADA trough level: 1.9, Ab < 25    Humira was increased to 40 mg weekly and mtx/folic acid added. I recommend cholestyramine for diarrhea but she never tried this. She did not remain compliant with methotrexate.    October 2019: ADA trough: 10, Ab < 25 on Humira 40mg weekly monotherapy.    October 2021: ADA trough 9.1, Ab < 25  October 2021 Colonoscopy: Rutgeerts i2a with ulcerations on the anastomosis only, patent IC anastomosis, normal neoterminal ileum, pseudopolyps and diverticulosis in the sigmoid colon.    June 2023: Ada level: 3.7 ([place])    She has always had problems with diarrhea.  She could not tolerate cholestyramine powder in the past.  She is still on metformin for diabetes.  She had some improvement in diarrhea when she took out dairy.  She was having problems with hemorrhoids due to diarrhea.  I recommended she try back imodium (was concerned it would cause constipation).     She has been intermittently compliant with Humira.  Last took it 2 weeks ago.  She feels better off the medication. She has had a lot of recent life stressors.  She continues to have diarrhea, she thinks the metformin makes it worse.  She is having anywhere from 10 + bowel movements a day.   She takes imodium and pepto prn.  When she takes it, it does help with decreasing the frequency of stools.  She is fearful of taking it regularly and causing constipation.  Her hemorrhoids have not been a problem lately.  Denies any abdominal pain, weight is stable.  Appetite is good.     She describes some joint issues, right shoulder and right hip.  She denies any rash or eye problems.     She denies a family history of inflammatory bowel disease, colon polyps, or colon cancer. She is still smoking 1 ppd. She denies any significant alcohol or recreational drugs.    IBD History:  IBD disease: Crohn's disease  Disease location: Ileocolonic  Disease behavior: stricturing, penetrating      Current IBD Therapy:  Adalimumab 40mg weekly (February 2019 - present)    Therapeutic Drug Monitoring Labs:  June 2023 ADA trough 3.7 ([place])  October 2021: ADA trough 9.1, Ab < 25 ([facility])  October 2019: ADA trough 10, Ab < 25 ([facility])  June 2023: Ada level: 3.7 ([place])    Prior IBD Therapies:  Steroids  Sulfasalazine  Asacol  Remicade      Pertinent Endoscopy/Imaging:  January 19, 2015: Colonoscopy: inflammatory mass around the area of the ileocecal valve and cecum, possible fistula in the right side of the colon, normal ascending, transverse and descending colon, sigmoid diverticulosis along with small polyps not resected in the sigmoid. Pathology report of the right colon mass showed severe chronic inflammation and biopsies of the sigmoid colon polyp showed mild chronic inflammation.    February 20, 2019: Colonoscopy: skin tags, mild puckering of the perianal skin. Evidence of a prior ileocecectomy with patent anastomosis. Superficial ulcerations involving the anastomosis only except for one 3 mm ulcer in the neoterminal ileum. The rest of the neoterminal ileum appeared normal. Approximately 15 cm of the neoterminal ileum was intubated. Biopsies were taken. Rutgeerts score i2a. The remaining colon was normal except for scattered  diverticuli in the sigmoid colon. She did have an area in the sigmoid with some scarring and 2 pseudopolyps. Gerard terminal ileum biopsy: mild chronic active inflammation. No significant glandular distortion, cryptitis, granulomas, or fibrosis.    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 External and internal hemorrhoids      Prior Pertinent Surgeries:   2016: exp laparotomy, small bowel resection with ileocecectomy, ileocolonic anastomosis, and takedown of an enterosigmoid fistula with primary repair of sigmoid defect. Pathology from this resection showed severely adherent intestinal severe transmural inflammation, fibrosis, and fistulas along with chronic inflammation, mucosal ulcerations and fibrosis consistent with Crohn's disease.    Complications:   penetrating disease as above      Extraintestinal Manifestations:  Arthritis    Review of Systems   Constitutional:  Negative for appetite change and unexpected weight change.   HENT:  Negative for trouble swallowing.    Respiratory:  Negative for chest tightness.    Cardiovascular: Negative.    Gastrointestinal:  Positive for diarrhea. Negative for abdominal pain and change in bowel habit.   Musculoskeletal:  Positive for arthralgias.   Neurological: Negative.    Psychiatric/Behavioral: Negative.     All other systems reviewed and are negative.         Medical/Surgical History:   Past Medical History:   Diagnosis Date    Crohn's disease     Diabetes mellitus      Past Surgical History:   Procedure Laterality Date     SECTION      CHOLECYSTECTOMY      colon rescetion  10/14/2013    TONSILLECTOMY, ADENOIDECTOMY           Family History:   Family History   Problem Relation Name Age of Onset    Hypertension Father      Diabetes Father      Diabetes Mother      Thyroid cancer Mother      Diabetes Brother       Diabetes Sister          Social History:   Social History     Socioeconomic History    Marital status:    Tobacco Use    Smoking status: Every Day     Current packs/day: 1.00     Types: Cigarettes    Smokeless tobacco: Never   Substance and Sexual Activity    Alcohol use: Yes     Comment: ocassionally    Drug use: Never    Sexual activity: Yes     Partners: Male     Birth control/protection: Post-menopausal     Social Determinants of Health     Financial Resource Strain: Medium Risk (8/16/2023)    Overall Financial Resource Strain (CARDIA)     Difficulty of Paying Living Expenses: Somewhat hard   Food Insecurity: No Food Insecurity (8/16/2023)    Hunger Vital Sign     Worried About Running Out of Food in the Last Year: Never true     Ran Out of Food in the Last Year: Never true   Transportation Needs: No Transportation Needs (8/16/2023)    PRAPARE - Transportation     Lack of Transportation (Medical): No     Lack of Transportation (Non-Medical): No   Physical Activity: Sufficiently Active (8/16/2023)    Exercise Vital Sign     Days of Exercise per Week: 7 days     Minutes of Exercise per Session: 60 min   Stress: Stress Concern Present (8/16/2023)    [employer] [place] [employer] - Occupational Stress Questionnaire     Feeling of Stress : Rather much   Housing Stability: Low Risk  (8/16/2023)    Housing Stability Vital Sign     Unable to Pay for Housing in the Last Year: No     Number of Places Lived in the Last Year: 1     Unstable Housing in the Last Year: No        Review of patient's allergies indicates:   Allergen Reactions    Iodine Swelling, Itching and Shortness Of Breath     Other reaction(s): Swelling of throat    Latex Hives, Itching and Swelling    Shellfish containing products Swelling     Other reaction(s): Swelling of throat    Codeine Hives and Itching       Current Medications:   Outpatient Medications Marked as Taking for the 7/23/24 encounter (Office Visit) with Nevin  "Izzy CUBA MD   Medication Sig Dispense Refill    biotin 5,000 mcg TbDL Take 1 tablet by mouth Daily.      ezetimibe (ZETIA) 10 mg tablet Take 1 tablet (10 mg total) by mouth once daily. 90 tablet 1    glipiZIDE (GLUCOTROL) 10 MG TR24 Take 1 tablet (10 mg total) by mouth daily with breakfast. 90 tablet 1    HUMIRA PEN PnKt injection Inject 40 mg into the skin every 7 days.      lisinopriL 10 MG tablet Take 1 tablet (10 mg total) by mouth once daily. 30 tablet 0    metFORMIN (GLUCOPHAGE-XR) 500 MG ER 24hr tablet Take 2 tablets (1,000 mg total) by mouth daily with breakfast. 180 tablet 1    multivit with minerals/lutein (MULTIVITAMIN 50 PLUS ORAL) Take 1 tablet by mouth Daily.          Vital Signs:  /76 (BP Location: Left arm, Patient Position: Sitting, BP Method: Medium (Automatic))   Pulse 87   Temp 98 °F (36.7 °C) (Oral)   Resp 12   Ht 5' 4" (1.626 m)   Wt 75 kg (165 lb 6.4 oz)   BMI 28.39 kg/m²      Physical Exam  Constitutional:       Appearance: Normal appearance.   HENT:      Head: Normocephalic and atraumatic.      Mouth/Throat:      Mouth: Mucous membranes are moist.   Eyes:      Conjunctiva/sclera: Conjunctivae normal.   Cardiovascular:      Rate and Rhythm: Normal rate and regular rhythm.   Pulmonary:      Effort: Pulmonary effort is normal.      Breath sounds: Normal breath sounds.   Abdominal:      General: Bowel sounds are normal.      Palpations: Abdomen is soft.   Musculoskeletal:         General: Normal range of motion.      Cervical back: Normal range of motion.   Skin:     General: Skin is warm.   Neurological:      General: No focal deficit present.      Mental Status: She is alert and oriented to person, place, and time. Mental status is at baseline.   Psychiatric:         Mood and Affect: Mood normal.         Behavior: Behavior normal.         Labs: Reviewed  Hgb   Date Value Ref Range Status   06/12/2024 13.5 12.0 - 16.0 g/dL Final     Albumin   Date Value Ref Range Status "   06/12/2024 3.7 3.4 - 4.8 g/dL Final     Iron Level   Date Value Ref Range Status   06/07/2023 49 (L) 50 - 170 ug/dL Final     Ferritin Level   Date Value Ref Range Status   06/07/2023 20.11 4.63 - 204.00 ng/mL Final     Folate Level   Date Value Ref Range Status   06/07/2023 13.5 7.0 - 31.4 ng/mL Final     Vitamin B12   Date Value Ref Range Status   06/07/2023 498 213 - 816 pg/mL Final     Vitamin D   Date Value Ref Range Status   06/07/2023 29.7 (L) 30.0 - 80.0 ng/mL Final     CRP   Date Value Ref Range Status   10/29/2019 1.3 (H) <<=0.3 mg/dL Final           Zinc: 80.5 (2023)  Quantiferon gold: negative June 2023  HS CRP: 10.55 (2023)  Fecal calprotectin: <16 (Feb 2019)       Assessment/Plan:    Problem List Items Addressed This Visit          GI    Crohn's disease of both small and large intestine without complication - Primary (Chronic)     History of enterovesicular, enteroenteric fistulas, and enterocutaneous fistulas s/p ileocecectomy in October 2016 October 2021 Colonoscopy: Rutgeerts i2a  Monotherapy for several years: Humira 40mg every week  Therapeutic trough previously but now intermittently compliant with medication  June 2023: Ada level: 3.7 ([place])    Plan colonoscopy to reevaluate current disease activity  Labs today.  Follow fecal calprotectin.   Stop smoking - discussed cessation  Will likely need continued biologic therapy but will consider changing therapy pending endoscopy findings.          Relevant Medications    polyethylene glycol (MOVIPREP) 100-7.5-2.691 gram solution    Other Relevant Orders    C-Reactive Protein    Calprotectin, Stool    Iron and TIBC    Ferritin    Folate    Vitamin B12    Vitamin D    Zinc    Quantiferon Gold TB    Hepatitis B Surface Antigen    Hepatitis A antibody, IgG    Hepatitis B Surface Ab, Qualitative    Case Request Endoscopy: COLONOSCOPY (Completed)    Functional diarrhea     Consider discontinuation of metformin if diabetes treatment allows  Recommend  diabetes control - last A1C 10%  Can use Imodium or pepto more regularly than prn.  Discussed this with patient.  Discussed other options including welchol which we have discussed in the past  Continue to refrain from dairy  Fecal calprotectin now  Colonoscopy planned            Other    Cigarette nicotine dependence without complication (Chronic)     Patient was counseled extensively on tobacco cessation.                  HEALTH MAINTENANCE:     Vaccinations:    Influenza (inactive):  recommended annually   PCV 13 (Prevnar): August 2019  PPSV 23 (Pneumovax): October 2019  PCV 20 (Prevnar): due now  Tetanus (TdaP): October 2017, recommended every 10 years  HPV (males and females ages 11-44 yo): N/A  Meningococcal: no risk factors  Hepatitis B: Twinrix x 2, 2019, recheck HBsAb  Hepatitis A:  Twinrix x 2 2019, recheck HAV IgG  MMR (live vaccine): UTD   Chickenpox status/Varicella (live vaccine):  had chickenpox as a child  Shingrix: vaccinated x 2  COVID-19: Pfizer 2021/2022      Colorectal cancer risk:  Risk factors: >  8 years of disease, > 1/3 colon involved    - Distribution of colonic disease: < 1/3 of colon       Ophthalmologic exam recommended yearly  Dermatologic exam recommended yearly due to risk of skin cancer with IBD/meds    Bone health:   Calcium 7439-6574 mg daily and vitamin D 800 IU daily   Risk factors for osteopenia/osteoporosis: none   Vitamin D: 29.7, recheck    Ergocalciferol 50,000 IU weekly x 12 weeks if low     DEXA scan: Recommend baseline    Smoking status: current smoker      Follow up: following colonoscopy

## 2024-07-23 NOTE — ASSESSMENT & PLAN NOTE
History of enterovesicular, enteroenteric fistulas, and enterocutaneous fistulas s/p ileocecectomy in October 2016 October 2021 Colonoscopy: Chai carmena  Monotherapy for several years: Humira 40mg every week  Therapeutic trough previously but now intermittently compliant with medication  June 2023: Ada level: 3.7 ([place])    Plan colonoscopy to reevaluate current disease activity  Labs today.  Follow fecal calprotectin.   Stop smoking - discussed cessation  Will likely need continued biologic therapy but will consider changing therapy pending endoscopy findings.

## 2024-07-23 NOTE — ASSESSMENT & PLAN NOTE
Consider discontinuation of metformin if diabetes treatment allows  Recommend diabetes control - last A1C 10%  Can use Imodium or pepto more regularly than prn.  Discussed this with patient.  Discussed other options including welchol which we have discussed in the past  Continue to refrain from dairy  Fecal calprotectin now  Colonoscopy planned

## 2024-07-25 LAB
AR ZINC, SERUM/PLASMA: 72.3 UG/DL
GAMMA INTERFERON BACKGROUND BLD IA-ACNC: 0.02 IU/ML
M TB IFN-G BLD-IMP: NEGATIVE
M TB IFN-G CD4+ BCKGRND COR BLD-ACNC: 0 IU/ML
M TB IFN-G CD4+CD8+ BCKGRND COR BLD-ACNC: 0 IU/ML
MITOGEN IGNF BCKGRD COR BLD-ACNC: >10 IU/ML

## 2024-08-02 DIAGNOSIS — K50.80 CROHN'S DISEASE OF BOTH SMALL AND LARGE INTESTINE WITHOUT COMPLICATION: Primary | ICD-10-CM

## 2024-08-05 ENCOUNTER — TELEPHONE (OUTPATIENT)
Dept: GASTROENTEROLOGY | Facility: CLINIC | Age: 59
End: 2024-08-05

## 2024-08-05 RX ORDER — ADALIMUMAB 40MG/0.8ML
40 KIT SUBCUTANEOUS WEEKLY
Qty: 4 PEN | Refills: 11 | Status: SHIPPED | OUTPATIENT
Start: 2024-08-05 | End: 2025-08-05

## 2024-08-06 DIAGNOSIS — I10 ESSENTIAL HYPERTENSION: ICD-10-CM

## 2024-08-07 RX ORDER — LISINOPRIL 10 MG/1
10 TABLET ORAL DAILY
Qty: 30 TABLET | Refills: 2 | Status: SHIPPED | OUTPATIENT
Start: 2024-08-07

## 2024-09-04 ENCOUNTER — HOSPITAL ENCOUNTER (EMERGENCY)
Facility: HOSPITAL | Age: 59
Discharge: HOME | End: 2024-09-04
Attending: EMERGENCY MEDICINE

## 2024-09-04 VITALS
WEIGHT: 165.38 LBS | DIASTOLIC BLOOD PRESSURE: 76 MMHG | TEMPERATURE: 98 F | RESPIRATION RATE: 18 BRPM | OXYGEN SATURATION: 99 % | BODY MASS INDEX: 28.24 KG/M2 | SYSTOLIC BLOOD PRESSURE: 141 MMHG | HEART RATE: 78 BPM | HEIGHT: 64 IN

## 2024-09-04 DIAGNOSIS — J06.9 UPPER RESPIRATORY TRACT INFECTION, UNSPECIFIED TYPE: Primary | ICD-10-CM

## 2024-09-04 LAB
FLUAV AG UPPER RESP QL IA.RAPID: NOT DETECTED
FLUBV AG UPPER RESP QL IA.RAPID: NOT DETECTED
RSV A 5' UTR RNA NPH QL NAA+PROBE: NOT DETECTED
SARS-COV-2 RNA RESP QL NAA+PROBE: NOT DETECTED
STREP A PCR (OHS): NOT DETECTED

## 2024-09-04 PROCEDURE — 99283 EMERGENCY DEPT VISIT LOW MDM: CPT

## 2024-09-04 PROCEDURE — 87651 STREP A DNA AMP PROBE: CPT | Performed by: NURSE PRACTITIONER

## 2024-09-04 PROCEDURE — 0241U COVID/RSV/FLU A&B PCR: CPT | Performed by: NURSE PRACTITIONER

## 2024-09-04 RX ORDER — LORATADINE 10 MG/1
10 TABLET ORAL DAILY
Qty: 30 TABLET | Refills: 0 | Status: SHIPPED | OUTPATIENT
Start: 2024-09-04 | End: 2024-10-04

## 2024-09-04 RX ORDER — PROMETHAZINE HYDROCHLORIDE AND DEXTROMETHORPHAN HYDROBROMIDE 6.25; 15 MG/5ML; MG/5ML
5 SYRUP ORAL EVERY 4 HOURS PRN
Qty: 120 ML | Refills: 0 | Status: SHIPPED | OUTPATIENT
Start: 2024-09-04 | End: 2024-09-14

## 2024-09-04 NOTE — ED PROVIDER NOTES
Encounter Date: 2024       History     Chief Complaint   Patient presents with    Nasal Congestion     Nasal congestion, sneezing, and runny nose (x)3 days. Afebrile at this time. Vss. nadn     The patient presents with occas nonprod cough, sore throat, nasal congestion, occasional sneezing, subjective fever, no cp or sob, no known covid-19 exposure.  The onset was 3 days ago.  The course/duration of symptoms is constant.  The degree at present is minimal.  The exacerbating factor is none.  The relieving factor is none.  Risk factors consist of none.  Prior episodes: occasional.  Associated symptoms: denies chest pain and denies shortness of breath.  Additional history: none.          Review of patient's allergies indicates:   Allergen Reactions    Iodine Swelling, Itching and Shortness Of Breath     Other reaction(s): Swelling of throat    Latex Hives, Itching and Swelling    Shellfish containing products Swelling     Other reaction(s): Swelling of throat    Codeine Hives and Itching     Past Medical History:   Diagnosis Date    Crohn's disease     Diabetes mellitus      Past Surgical History:   Procedure Laterality Date     SECTION      CHOLECYSTECTOMY      colon rescetion  10/14/2013    TONSILLECTOMY, ADENOIDECTOMY       Family History   Problem Relation Name Age of Onset    Hypertension Father      Diabetes Father      Diabetes Mother      Thyroid cancer Mother      Diabetes Brother      Diabetes Sister       Social History     Tobacco Use    Smoking status: Every Day     Current packs/day: 1.00     Types: Cigarettes    Smokeless tobacco: Never   Substance Use Topics    Alcohol use: Yes     Comment: ocassionally    Drug use: Never     Review of Systems   Constitutional:  Positive for fever.   HENT:  Positive for congestion and sore throat.    Respiratory:  Positive for cough. Negative for shortness of breath.    Cardiovascular:  Negative for chest pain.   Gastrointestinal:  Negative for nausea.    Genitourinary:  Negative for dysuria.   Musculoskeletal:  Negative for back pain.   Skin:  Negative for rash.   Neurological:  Negative for weakness.   Hematological:  Does not bruise/bleed easily.   All other systems reviewed and are negative.      Physical Exam     Initial Vitals [09/04/24 1330]   BP Pulse Resp Temp SpO2   (!) 148/74 83 18 97.8 °F (36.6 °C) 99 %      MAP       --         Physical Exam    Nursing note and vitals reviewed.  Constitutional: She appears well-developed and well-nourished.   HENT:   Head: Normocephalic and atraumatic.   Right Ear: Tympanic membrane normal.   Left Ear: Tympanic membrane normal.   Nose: Nose normal.   Mouth/Throat: Uvula is midline, oropharynx is clear and moist and mucous membranes are normal.   Neck: Neck supple.   Normal range of motion.  Cardiovascular:  Normal rate, regular rhythm, normal heart sounds and intact distal pulses.           Pulmonary/Chest: Effort normal and breath sounds normal. She has no decreased breath sounds.   Abdominal: Abdomen is soft and flat. Bowel sounds are normal. There is no abdominal tenderness.   Musculoskeletal:         General: Normal range of motion.      Cervical back: Normal range of motion and neck supple.     Neurological: She is alert. She has normal strength.   Skin: Skin is warm and dry.   Psychiatric: She has a normal mood and affect.         ED Course   Procedures  Labs Reviewed   STREP GROUP A BY PCR - Normal       Result Value    STREP A PCR ([facility]) Not Detected      Narrative:     The Xpert Xpress Strep A test is a rapid, qualitative in vitro diagnostic test for the detection of Streptococcus pyogenes (Group A ß-hemolytic Streptococcus, Strep A) in throat swab specimens from patients with signs and symptoms of pharyngitis.     COVID/RSV/FLU A&B PCR - Normal    Influenza A PCR Not Detected      Influenza B PCR Not Detected      Respiratory Syncytial Virus PCR Not Detected      SARS-CoV-2 PCR Not Detected      Narrative:      The Xpert Xpress SARS-CoV-2/FLU/RSV plus is a rapid, multiplexed real-time PCR test intended for the simultaneous qualitative detection and differentiation of SARS-CoV-2, Influenza A, Influenza B, and respiratory syncytial virus (RSV) viral RNA in either nasopharyngeal swab or nasal swab specimens.                Imaging Results    None          Medications - No data to display  Medical Decision Making  The patient presents with occas nonprod cough, sore throat, nasal congestion, occasional sneezing, subjective fever, no cp or sob, no known covid-19 exposure.  The onset was 3 days ago.  The course/duration of symptoms is constant.  The degree at present is minimal.  The exacerbating factor is none.  The relieving factor is none.  Risk factors consist of none.  Prior episodes: occasional.  Associated symptoms: denies chest pain and denies shortness of breath.  Additional history: none.        3:01 PM DISPOSITION: The patient is resting comfortably in no acute distress.  She is hemodynamically stable and is without objective evidence for acute process requiring urgent intervention or hospitalization. I provided counseling to patient with regard to condition, the treatment plan, specific conditions for return, and the importance of follow up. Detailed written and verbal instructions provided to patient and she expressed a verbal understanding of the discharge instructions and overall management plan. Reiterated the importance of medication administration and safety and advised patient to follow up with primary care provider in 3-5 days or sooner if needed.  Answered questions at this time. The patient is stable for discharge.         Additional MDM:   Differential Diagnosis:   At this time differential diagnosis is but not limited to influenza, strep throat, covid, rsv, viral uri              ED Course as of 09/04/24 1503   Wed Sep 04, 2024   1501 Given strict ED return precautions. I have spoken with the patient  and/or caregivers. I have explained the patient's condition, diagnoses and treatment plan based on the information available to me at this time. I have answered the patient's and/or caregiver's questions and addressed any concerns. The patient and/or caregivers have as good an understanding of the patient's diagnosis, condition and treatment plan as can be expected at this point. The vital signs have been stable. The patient's condition is stable and appropriate for discharge from the emergency department.      The patient will pursue further outpatient evaluation with the primary care physician or other designated or consulting physician as outlined in the discharge instructions. The patient and/or caregivers are agreeable to this plan of care and follow-up instructions have been explained in detail. The patient and/or caregivers have received these instructions in written format and have expressed an understanding of the discharge instructions. The patient and/or caregivers are aware that any significant change in condition or worsening of symptoms should prompt an immediate return to this or the closest emergency department or a call to 911.      [RB]      ED Course User Index  [RB] Jas Byrd ACNP                           Clinical Impression:  Final diagnoses:  [J06.9] Upper respiratory tract infection, unspecified type (Primary)          ED Disposition Condition    Discharge Stable          ED Prescriptions       Medication Sig Dispense Start Date End Date Auth. Provider    promethazine-dextromethorphan (PROMETHAZINE-DM) 6.25-15 mg/5 mL Syrp Take 5 mLs by mouth every 4 (four) hours as needed (cough). 120 mL 9/4/2024 9/14/2024 Jas Byrd ACNP    loratadine (CLARITIN) 10 mg tablet Take 1 tablet (10 mg total) by mouth once daily. 30 tablet 9/4/2024 10/4/2024 Jas Byrd ACNP          Follow-up Information       Follow up With Specialties Details Why Contact Info    Viv Lipscomb, FNP Family Medicine In 3  days  [address] [facility] [address]  [phone]      [facility] Emergency Medicine  If symptoms worsen [address] [facility] [id number] [phone]             Jas Byrd, [facility]  09/04/24 1503

## 2024-09-09 ENCOUNTER — OFFICE VISIT (OUTPATIENT)
Dept: INTERNAL MEDICINE | Facility: CLINIC | Age: 59
End: 2024-09-09

## 2024-09-09 ENCOUNTER — LAB VISIT (OUTPATIENT)
Dept: LAB | Facility: HOSPITAL | Age: 59
End: 2024-09-09
Attending: NURSE PRACTITIONER

## 2024-09-09 VITALS
WEIGHT: 165 LBS | HEART RATE: 81 BPM | DIASTOLIC BLOOD PRESSURE: 72 MMHG | RESPIRATION RATE: 18 BRPM | SYSTOLIC BLOOD PRESSURE: 118 MMHG | HEIGHT: 64 IN | TEMPERATURE: 98 F | BODY MASS INDEX: 28.17 KG/M2

## 2024-09-09 DIAGNOSIS — E11.65 TYPE 2 DIABETES MELLITUS WITH HYPERGLYCEMIA, WITHOUT LONG-TERM CURRENT USE OF INSULIN: Primary | Chronic | ICD-10-CM

## 2024-09-09 DIAGNOSIS — E11.65 TYPE 2 DIABETES MELLITUS WITH HYPERGLYCEMIA, WITHOUT LONG-TERM CURRENT USE OF INSULIN: ICD-10-CM

## 2024-09-09 DIAGNOSIS — J01.20 ACUTE NON-RECURRENT ETHMOIDAL SINUSITIS: ICD-10-CM

## 2024-09-09 DIAGNOSIS — F17.210 CIGARETTE NICOTINE DEPENDENCE WITHOUT COMPLICATION: Chronic | ICD-10-CM

## 2024-09-09 DIAGNOSIS — Z78.9 STATIN INTOLERANCE: Chronic | ICD-10-CM

## 2024-09-09 DIAGNOSIS — K50.80 CROHN'S DISEASE OF BOTH SMALL AND LARGE INTESTINE WITHOUT COMPLICATION: Chronic | ICD-10-CM

## 2024-09-09 DIAGNOSIS — Z00.00 WELL ADULT EXAM: ICD-10-CM

## 2024-09-09 PROBLEM — J32.9 SINUSITIS: Status: ACTIVE | Noted: 2024-09-09

## 2024-09-09 LAB
ALBUMIN SERPL-MCNC: 3.6 G/DL (ref 3.4–4.8)
ALBUMIN/GLOB SERPL: 0.9 RATIO (ref 1.1–2)
ALP SERPL-CCNC: 85 UNIT/L (ref 40–150)
ALT SERPL-CCNC: 14 UNIT/L (ref 0–55)
ANION GAP SERPL CALC-SCNC: 9 MEQ/L
AST SERPL-CCNC: 11 UNIT/L (ref 5–34)
BASOPHILS # BLD AUTO: 0.07 X10(3)/MCL
BASOPHILS NFR BLD AUTO: 0.7 %
BILIRUB SERPL-MCNC: 0.3 MG/DL
BUN SERPL-MCNC: 16.2 MG/DL (ref 9.8–20.1)
CALCIUM SERPL-MCNC: 9.6 MG/DL (ref 8.4–10.2)
CHLORIDE SERPL-SCNC: 107 MMOL/L (ref 98–107)
CHOLEST SERPL-MCNC: 179 MG/DL
CHOLEST/HDLC SERPL: 4 {RATIO} (ref 0–5)
CO2 SERPL-SCNC: 23 MMOL/L (ref 23–31)
CREAT SERPL-MCNC: 1.04 MG/DL (ref 0.55–1.02)
CREAT/UREA NIT SERPL: 16
EOSINOPHIL # BLD AUTO: 0.23 X10(3)/MCL (ref 0–0.9)
EOSINOPHIL NFR BLD AUTO: 2.2 %
ERYTHROCYTE [DISTWIDTH] IN BLOOD BY AUTOMATED COUNT: 12.7 % (ref 11.5–17)
EST. AVERAGE GLUCOSE BLD GHB EST-MCNC: 260.4 MG/DL
GFR SERPLBLD CREATININE-BSD FMLA CKD-EPI: >60 ML/MIN/1.73/M2
GLOBULIN SER-MCNC: 4 GM/DL (ref 2.4–3.5)
GLUCOSE SERPL-MCNC: 212 MG/DL (ref 82–115)
HBA1C MFR BLD: 10.7 %
HCT VFR BLD AUTO: 39.4 % (ref 37–47)
HDLC SERPL-MCNC: 47 MG/DL (ref 35–60)
HGB BLD-MCNC: 13 G/DL (ref 12–16)
IMM GRANULOCYTES # BLD AUTO: 0.03 X10(3)/MCL (ref 0–0.04)
IMM GRANULOCYTES NFR BLD AUTO: 0.3 %
LDLC SERPL CALC-MCNC: 90 MG/DL (ref 50–140)
LYMPHOCYTES # BLD AUTO: 2.85 X10(3)/MCL (ref 0.6–4.6)
LYMPHOCYTES NFR BLD AUTO: 27 %
MCH RBC QN AUTO: 29.6 PG (ref 27–31)
MCHC RBC AUTO-ENTMCNC: 33 G/DL (ref 33–36)
MCV RBC AUTO: 89.7 FL (ref 80–94)
MONOCYTES # BLD AUTO: 0.66 X10(3)/MCL (ref 0.1–1.3)
MONOCYTES NFR BLD AUTO: 6.3 %
NEUTROPHILS # BLD AUTO: 6.72 X10(3)/MCL (ref 2.1–9.2)
NEUTROPHILS NFR BLD AUTO: 63.5 %
NRBC BLD AUTO-RTO: 0 %
PLATELET # BLD AUTO: 411 X10(3)/MCL (ref 130–400)
PMV BLD AUTO: 9.1 FL (ref 7.4–10.4)
POTASSIUM SERPL-SCNC: 4.1 MMOL/L (ref 3.5–5.1)
PROT SERPL-MCNC: 7.6 GM/DL (ref 5.8–7.6)
RBC # BLD AUTO: 4.39 X10(6)/MCL (ref 4.2–5.4)
SODIUM SERPL-SCNC: 139 MMOL/L (ref 136–145)
TRIGL SERPL-MCNC: 212 MG/DL (ref 37–140)
VLDLC SERPL CALC-MCNC: 42 MG/DL
WBC # BLD AUTO: 10.56 X10(3)/MCL (ref 4.5–11.5)

## 2024-09-09 PROCEDURE — 99214 OFFICE O/P EST MOD 30 MIN: CPT | Mod: PBBFAC | Performed by: NURSE PRACTITIONER

## 2024-09-09 PROCEDURE — 85025 COMPLETE CBC W/AUTO DIFF WBC: CPT

## 2024-09-09 PROCEDURE — 83036 HEMOGLOBIN GLYCOSYLATED A1C: CPT

## 2024-09-09 PROCEDURE — 99214 OFFICE O/P EST MOD 30 MIN: CPT | Mod: S$PBB,,, | Performed by: NURSE PRACTITIONER

## 2024-09-09 PROCEDURE — 80061 LIPID PANEL: CPT

## 2024-09-09 PROCEDURE — 36415 COLL VENOUS BLD VENIPUNCTURE: CPT

## 2024-09-09 PROCEDURE — 80053 COMPREHEN METABOLIC PANEL: CPT

## 2024-09-09 RX ORDER — INSULIN LISPRO 100 [IU]/ML
3 INJECTION, SOLUTION INTRAVENOUS; SUBCUTANEOUS
Qty: 2.7 ML | Refills: 3 | Status: SHIPPED | OUTPATIENT
Start: 2024-09-09 | End: 2025-09-09

## 2024-09-09 RX ORDER — INSULIN GLARGINE 100 [IU]/ML
20 INJECTION, SOLUTION SUBCUTANEOUS NIGHTLY
Qty: 18 ML | Refills: 3 | Status: SHIPPED | OUTPATIENT
Start: 2024-09-09 | End: 2025-09-09

## 2024-09-09 RX ORDER — AMOXICILLIN 875 MG/1
875 TABLET, FILM COATED ORAL EVERY 12 HOURS
Qty: 14 TABLET | Refills: 0 | Status: SHIPPED | OUTPATIENT
Start: 2024-09-09 | End: 2024-09-16

## 2024-09-09 NOTE — PROGRESS NOTES
Patient ID: [id number]     Chief Complaint: Establish Care        HPI:     HPI      Janel Richmond is a 59 y.o. female here today to establish care. Pt has hx DM2, Crohn's, Statin intolerance, Cigarette Nicotine dependence. Pt previously followed by Viv Lipscomb NP.         Immunizations:   Immunization History   Administered Date(s) Administered    COVID-19, MRNA, LN-S, PF (Pfizer) (Gray Cap) 2022    COVID-19, MRNA, LN-S, PF (Pfizer) (Purple Cap) 2021, 2021, 2021    Hepatitis A / Hepatitis B 2019, 10/29/2019    Influenza - Quadrivalent - PF *Preferred* (6 months and older) 10/18/2023    Influenza - Trivalent - PF (ADULT) 10/21/2021    PPD Test 11/10/2014    Pneumococcal Conjugate - 13 Valent 2019    Pneumococcal Polysaccharide - 23 Valent 10/29/2019    Tdap 10/09/2017    Zoster Recombinant 2019, 2021        -------------------------------------    Crohn's disease    Diabetes mellitus        Past Surgical History:   Procedure Laterality Date     SECTION      CHOLECYSTECTOMY      colon rescetion  10/14/2013    TONSILLECTOMY, ADENOIDECTOMY         Review of patient's allergies indicates:   Allergen Reactions    Iodine Swelling, Itching and Shortness Of Breath     Other reaction(s): Swelling of throat    Latex Hives, Itching and Swelling    Shellfish containing products Swelling     Other reaction(s): Swelling of throat    Codeine Hives and Itching       Current Outpatient Medications   Medication Instructions    biotin 5,000 mcg TbDL 1 tablet, Oral, Daily    ezetimibe (ZETIA) 10 mg, Oral, Daily    HUMIRA PEN 40 mg, Subcutaneous, Weekly    hydrOXYzine HCL (ATARAX) 25 mg, Oral, Nightly PRN    lisinopriL 10 mg, Oral, Daily    loratadine (CLARITIN) 10 mg, Oral, Daily    metFORMIN (GLUCOPHAGE-XR) 1,000 mg, Oral, With breakfast    multivit with minerals/lutein (MULTIVITAMIN 50 PLUS ORAL) 1 tablet, Oral, Daily    polyethylene glycol (MOVIPREP) 100-7.5-2.691 gram  solution Take as directed prior to colonoscopy    promethazine-dextromethorphan (PROMETHAZINE-DM) 6.25-15 mg/5 mL Syrp 5 mLs, Oral, Every 4 hours PRN       Social History     Socioeconomic History    Marital status:    Tobacco Use    Smoking status: Every Day     Current packs/day: 1.00     Average packs/day: 1 pack/day for 21.7 years (21.7 ttl pk-yrs)     Types: Cigarettes     Start date: 1/1/2003    Smokeless tobacco: Never   Substance and Sexual Activity    Alcohol use: Yes     Comment: ocassionally    Drug use: Never    Sexual activity: Yes     Partners: Male     Birth control/protection: Post-menopausal     Social Determinants of Health     Financial Resource Strain: Medium Risk (8/16/2023)    Overall Financial Resource Strain (CARDIA)     Difficulty of Paying Living Expenses: Somewhat hard   Food Insecurity: No Food Insecurity (8/16/2023)    Hunger Vital Sign     Worried About Running Out of Food in the Last Year: Never true     Ran Out of Food in the Last Year: Never true   Transportation Needs: No Transportation Needs (8/16/2023)    PRAPARE - Transportation     Lack of Transportation (Medical): No     Lack of Transportation (Non-Medical): No   Physical Activity: Sufficiently Active (8/16/2023)    Exercise Vital Sign     Days of Exercise per Week: 7 days     Minutes of Exercise per Session: 60 min   Stress: Stress Concern Present (8/16/2023)    [employer] [place] [employer] - Occupational Stress Questionnaire     Feeling of Stress : Rather much   Housing Stability: Low Risk  (8/16/2023)    Housing Stability Vital Sign     Unable to Pay for Housing in the Last Year: No     Number of Places Lived in the Last Year: 1     Unstable Housing in the Last Year: No        Family History   Problem Relation Name Age of Onset    Hypertension Father      Diabetes Father      Diabetes Mother      Thyroid cancer Mother      Diabetes Brother      Diabetes Sister          Patient Care Team:  Viv Lipscomb,  "FNP as PCP - General (Family Medicine)     Subjective:     Review of Systems     See HPI for details    Constitutional: Denies Change in appetite. Denies Chills. Denies Fever. Denies Night sweats.  Eye: Denies Blurred vision. Denies Discharge. Denies Eye pain.  ENT: Denies Decreased hearing. Denies Sore throat. Denies Swollen glands.  Respiratory: Denies Cough. Denies Shortness of breath. Denies Shortness of breath with exertion. Denies Wheezing.  Cardiovascular: DeniesChest pain at rest. Denies Chest pain with exertion. Denies Irregular heartbeat. Denies Palpitations. Denies Edema.  Gastrointestinal: Denies Abdominal pain. DeniesDiarrhea. Denies Nausea. Denies Vomiting. Denies Hematemesis or Hematochezia.  Genitourinary: Denies Dysuria. Denies Urinary frequency. Denies Urinary urgency. Denies Blood in urine.  Endocrine: Denies Cold intolerance. Denies Excessive thirst. Denies Heat intolerance. Denies Weight loss. Denies Weight gain.  Musculoskeletal: Denies Painful joints. Denies Weakness.  Integumentary: Denies Rash. Denies Itching. Denies Dry skin.  Neurologic: Denies Dizziness. Denies Fainting. Denies Headache.  Psychiatric: Denies Depression. Denies Anxiety. Denies Suicidal/Homicidal ideations.    All Other ROS: Negative except as stated in HPI.       Objective:     Visit Vitals  /72 (BP Location: Right arm, Patient Position: Sitting, BP Method: Large (Automatic))   Pulse 81   Temp 98.4 °F (36.9 °C) (Oral)   Resp 18   Ht 5' 4" (1.626 m)   Wt 74.8 kg (165 lb)   BMI 28.32 kg/m²       Physical Exam    General: Alert and oriented, No acute distress.  Head: Normocephalic, Atraumatic.  Eye: Pupils are equal, round and reactive to light, Extraocular movements are intact, Sclera non-icteric.  Ears/Nose/Throat: Normal, Mucosa moist,Clear.  Neck/Thyroid: Supple, Non-tender, No carotid bruit, No lymphadenopathy, No JVD, Full range of motion.  Respiratory: Clear to auscultation bilaterally; No wheezes, rales or " rhonchi,Non-labored respirations, Symmetrical chest wall expansion.  Cardiovascular: Regular rate and rhythm, S1/S2 normal, No murmurs, rubs or gallops.  Gastrointestinal: Soft, Non-tender, Non-distended, Normal bowel sounds, No palpable organomegaly.  Musculoskeletal: Normal range of motion.  Integumentary: Warm, Dry, Intact, No suspicious lesions or rashes.  Extremities: No clubbing, cyanosis or edema  Neurologic: No focal deficits, Cranial Nerves II-XII are grossly intact, Motor strength normal upper and lower extremities, Sensory exam intact.  Psychiatric: Normal interaction, Coherent speech, Euthymic mood, Appropriate affect       Labs Reviewed:     Chemistry:  Lab Results   Component Value Date     09/09/2024    K 4.1 09/09/2024    BUN 16.2 09/09/2024    CREATININE 1.04 (H) 09/09/2024    EGFRNORACEVR >60 09/09/2024    GLUCOSE 212 (H) 09/09/2024    CALCIUM 9.6 09/09/2024    ALKPHOS 85 09/09/2024    LABPROT 7.6 09/09/2024    ALBUMIN 3.6 09/09/2024    BILIDIR 0.2 11/22/2021    IBILI 0.40 11/22/2021    AST 11 09/09/2024    ALT 14 09/09/2024    [id number] 25 (L) 07/23/2024        Lab Results   Component Value Date    HGBA1C 10.7 (H) 09/09/2024        Hematology:  Lab Results   Component Value Date    WBC 10.56 09/09/2024    HGB 13.0 09/09/2024    HCT 39.4 09/09/2024     (H) 09/09/2024       Lipid Panel:  Lab Results   Component Value Date    CHOL 179 09/09/2024    HDL 47 09/09/2024    LDL 90.00 09/09/2024    TRIG 212 (H) 09/09/2024    TOTALCHOLEST 4 09/09/2024        Urine:  Lab Results   Component Value Date    APPEARANCEUA Clear 06/12/2024    SGUA 1.032 (H) 06/12/2024    PROTEINUA 1+ (A) 06/12/2024    KETONESUA Trace (A) 06/12/2024    LEUKOCYTESUR 25 (A) 06/12/2024    RBCUA 0-5 06/12/2024    WBCUA 0-5 06/12/2024    BACTERIA Many (A) 06/12/2024    SQEPUA Moderate (A) 06/12/2024    HYALINECASTS 6-10 (A) 06/12/2024    CREATRANDUR 262.0 (H) 06/12/2024    PROTEINURINE 28.7 10/29/2019        Assessment:        ICD-10-CM ICD-9-CM   1. Type 2 diabetes mellitus with hyperglycemia, without long-term current use of insulin  E11.65 250.00     790.29   2. Crohn's disease of both small and large intestine without complication  K50.80 555.2   3. Statin intolerance  Z78.9 995.27   4. Cigarette nicotine dependence without complication  F17.210 305.1   5. Well adult exam  Z00.00 V70.0   6. Acute non-recurrent ethmoidal sinusitis  J01.20 461.2        Plan:     1. Type 2 diabetes mellitus with hyperglycemia, without long-term current use of insulin  A1c 9.9 today. ADA diet and exercise.  Pt requesting to start on Insulin therapy. D/C Glipizide. Pt states Metformin causing too much diarrhea and with crohn's its even worse. Informed pt Metformin increases insulin sensitivity and recommend she at least take med daily so will decrease Metformin dosage to 500 mg XR 1 tab po Q hs with meal.  Will start on Lantus 20 units injected sq Q hs. Will start Novolin A1c in 1 month. Urine microalbumin 6-12-24. DM eye exam 6-12-24. DM foot exam done today.   - POCT HEMOGLOBIN A1C    Protective Sensation (w/ 10 gram monofilament):  Right: Intact  Left: Intact    Visual Inspection:  Normal -  Bilateral    Pedal Pulses:   Right: Present  Left: Present    Posterior Tibialis Pulses:   Right:Present  Left: Present     2. Crohn's disease of both small and large intestine without complication  Pt followed in GI cl. Keep appt. Cont Humira as prescribed per GI.     3. Statin intolerance  Pt intolerant to statin therapy. Low fat diet and exercise encouraged. Cont Zetia as prescribed.     4. Cigarette nicotine dependence without complication  Encouraged smoking cessation. Education provided.     5. Well adult exam  Labs in 1 month. Pt refusing MMG at this time due to currently self pay and has financial issues with children at present. Will readdress MMG at next visit.  Keep colonoscopy appt 12-2-24 as scheduled. Pt followed in GYN cl for pap smears. Keep  appts.      6. Acute sinusitis  Pt states has been having sinus pain with bad odor with yellowish nasal drainage X 1 week that is not improving with OTC meds. Will RX Amoxicillin 875 mg 1 tab po BID X 7 days. Drink plenty of water.       Follow up in about 1 month (around 10/9/2024) for with labs 1 week prior to appt. . In addition to their scheduled follow up, the patient has also been instructed to follow up on as needed basis.     Future Appointments   Date Time Provider Department Center   11/22/2024  7:50 AM Jazlyn Cheek ANP [facility] GYN [facility]   2/18/2025  1:45 PM Izzy Rooney MD [facility] GASTRO [place] [facility]        [place]

## 2024-10-31 ENCOUNTER — PATIENT MESSAGE (OUTPATIENT)
Dept: GASTROENTEROLOGY | Facility: CLINIC | Age: 59
End: 2024-10-31

## 2024-11-29 ENCOUNTER — ANESTHESIA EVENT (OUTPATIENT)
Dept: ENDOSCOPY | Facility: HOSPITAL | Age: 59
End: 2024-11-29

## 2024-11-29 RX ORDER — LIDOCAINE HYDROCHLORIDE 10 MG/ML
1 INJECTION, SOLUTION EPIDURAL; INFILTRATION; INTRACAUDAL; PERINEURAL ONCE
OUTPATIENT
Start: 2024-11-29 | End: 2024-11-29

## 2024-11-29 RX ORDER — SODIUM CHLORIDE, SODIUM LACTATE, POTASSIUM CHLORIDE, CALCIUM CHLORIDE 600; 310; 30; 20 MG/100ML; MG/100ML; MG/100ML; MG/100ML
INJECTION, SOLUTION INTRAVENOUS CONTINUOUS
OUTPATIENT
Start: 2024-11-29

## 2024-11-29 NOTE — ANESTHESIA PREPROCEDURE EVALUATION
"                                                                                                             11/29/2024  Janel Richmond is a 59 y.o., female with PMHx of HLD, smoking, DM presents for colonoscopy secondary to Crohn's.    NO BETA BLOCKER USE    Active Ambulatory Problems     Diagnosis Date Noted    Crohn's disease of both small and large intestine without complication 07/10/2023    Functional diarrhea 07/10/2023    Cigarette nicotine dependence without complication 07/10/2023    Type 2 diabetes mellitus with hyperglycemia, without long-term current use of insulin 08/16/2023    Vitamin D deficiency 08/16/2023    Statin intolerance 08/23/2023    Hyperlipidemia 08/23/2023    Well adult exam 09/09/2024    Sinusitis 09/09/2024     Resolved Ambulatory Problems     Diagnosis Date Noted    No Resolved Ambulatory Problems     Past Medical History:   Diagnosis Date    Crohn's disease     Diabetes mellitus     HLD (hyperlipidemia)        Pre-op Assessment    I have reviewed the NPO Status.      Review of Systems  Anesthesia Hx:  No problems with previous Anesthesia                Social:  Smoker       Cardiovascular:                hyperlipidemia                               Pulmonary:  Pulmonary Normal                       Renal/:  Renal/ Normal                 Hepatic/GI:  Hepatic/GI Normal                    Endocrine:  Diabetes, poorly controlled, type 2             Vitals:    12/02/24 1338 12/02/24 1339   BP: 135/71 135/71   BP Location: Left arm    Patient Position: Lying    Pulse: 94 94   Resp: 20    Temp: 36.7 °C (98 °F)    TempSrc: Oral    SpO2: 98%    Weight: 75 kg (165 lb 6.4 oz)    Height: 5' 4" (1.626 m)          Physical Exam  General: Cooperative, Well nourished and Alert    Airway:  Mallampati: II   Mouth Opening: Normal  TM Distance: Normal  Tongue: Normal  Neck ROM: Normal ROM    Dental:  Partial Dentures    Chest/Lungs:  Clear to auscultation, Normal Respiratory Rate    Heart:  Rate: " Normal  Rhythm: Regular Rhythm  Sounds: Normal       Latest Reference Range & Units 12/02/24 13:29   POCT Glucose 70 - 110 mg/dL 216 (H)   (H): Data is abnormally high  Lab Results   Component Value Date    WBC 10.56 09/09/2024    HGB 13.0 09/09/2024    HCT 39.4 09/09/2024    MCV 89.7 09/09/2024     (H) 09/09/2024       CMP  Sodium   Date Value Ref Range Status   09/09/2024 139 136 - 145 mmol/L Final     Potassium   Date Value Ref Range Status   09/09/2024 4.1 3.5 - 5.1 mmol/L Final     Chloride   Date Value Ref Range Status   09/09/2024 107 98 - 107 mmol/L Final     CO2   Date Value Ref Range Status   09/09/2024 23 23 - 31 mmol/L Final     Blood Urea Nitrogen   Date Value Ref Range Status   09/09/2024 16.2 9.8 - 20.1 mg/dL Final     Creatinine   Date Value Ref Range Status   09/09/2024 1.04 (H) 0.55 - 1.02 mg/dL Final     Calcium   Date Value Ref Range Status   09/09/2024 9.6 8.4 - 10.2 mg/dL Final     Albumin   Date Value Ref Range Status   09/09/2024 3.6 3.4 - 4.8 g/dL Final     Bilirubin Total   Date Value Ref Range Status   09/09/2024 0.3 <=1.5 mg/dL Final     ALP   Date Value Ref Range Status   09/09/2024 85 40 - 150 unit/L Final     AST   Date Value Ref Range Status   09/09/2024 11 5 - 34 unit/L Final     ALT   Date Value Ref Range Status   09/09/2024 14 0 - 55 unit/L Final     eGFR   Date Value Ref Range Status   09/09/2024 >60 mL/min/1.73/m2 Final     Lab Results   Component Value Date    HGBA1C 10.7 (H) 09/09/2024         Anesthesia Plan  Type of Anesthesia, risks & benefits discussed:    Anesthesia Type: Gen Natural Airway  Intra-op Monitoring Plan: Standard ASA Monitors  Post Op Pain Control Plan: IV/PO Opioids PRN  Induction:  IV  Informed Consent: Informed consent signed with the Patient and all parties understand the risks and agree with anesthesia plan.  All questions answered.   ASA Score: 3  Day of Surgery Review of History & Physical: H&P Update referred to the surgeon/provider.    Ready  For Surgery From Anesthesia Perspective.     .

## 2024-12-02 ENCOUNTER — HOSPITAL ENCOUNTER (OUTPATIENT)
Facility: HOSPITAL | Age: 59
Discharge: HOME | End: 2024-12-02
Attending: INTERNAL MEDICINE | Admitting: INTERNAL MEDICINE

## 2024-12-02 ENCOUNTER — ANESTHESIA (OUTPATIENT)
Dept: ENDOSCOPY | Facility: HOSPITAL | Age: 59
End: 2024-12-02

## 2024-12-02 DIAGNOSIS — K50.80 CROHN'S DISEASE OF BOTH SMALL AND LARGE INTESTINE WITHOUT COMPLICATION: Primary | Chronic | ICD-10-CM

## 2024-12-02 LAB
POCT GLUCOSE: 135 MG/DL (ref 70–110)
POCT GLUCOSE: 216 MG/DL (ref 70–110)

## 2024-12-02 PROCEDURE — 25000003 PHARM REV CODE 250: Performed by: INTERNAL MEDICINE

## 2024-12-02 PROCEDURE — 37000008 HC ANESTHESIA 1ST 15 MINUTES: Performed by: INTERNAL MEDICINE

## 2024-12-02 PROCEDURE — 63600175 PHARM REV CODE 636 W HCPCS: Performed by: ANESTHESIOLOGY

## 2024-12-02 PROCEDURE — 82962 GLUCOSE BLOOD TEST: CPT | Performed by: INTERNAL MEDICINE

## 2024-12-02 PROCEDURE — 63600175 PHARM REV CODE 636 W HCPCS: Performed by: NURSE ANESTHETIST, CERTIFIED REGISTERED

## 2024-12-02 PROCEDURE — 37000009 HC ANESTHESIA EA ADD 15 MINS: Performed by: INTERNAL MEDICINE

## 2024-12-02 PROCEDURE — 45378 DIAGNOSTIC COLONOSCOPY: CPT | Performed by: INTERNAL MEDICINE

## 2024-12-02 RX ORDER — DEXTROMETHORPHAN/PSEUDOEPHED 2.5-7.5/.8
DROPS ORAL
Status: DISCONTINUED | OUTPATIENT
Start: 2024-12-02 | End: 2024-12-02 | Stop reason: HOSPADM

## 2024-12-02 RX ORDER — PROPOFOL 10 MG/ML
INJECTION, EMULSION INTRAVENOUS
Status: DISCONTINUED | OUTPATIENT
Start: 2024-12-02 | End: 2024-12-02

## 2024-12-02 RX ORDER — INSULIN ASPART 100 [IU]/ML
0-5 INJECTION, SOLUTION INTRAVENOUS; SUBCUTANEOUS EVERY 4 HOURS PRN
Status: DISCONTINUED | OUTPATIENT
Start: 2024-12-02 | End: 2024-12-02 | Stop reason: HOSPADM

## 2024-12-02 RX ORDER — LIDOCAINE HYDROCHLORIDE 20 MG/ML
INJECTION INTRAVENOUS
Status: DISCONTINUED | OUTPATIENT
Start: 2024-12-02 | End: 2024-12-02

## 2024-12-02 RX ADMIN — INSULIN ASPART 2 UNITS: 100 INJECTION, SOLUTION INTRAVENOUS; SUBCUTANEOUS at 02:12

## 2024-12-02 RX ADMIN — PROPOFOL 120 MCG/KG/MIN: 10 INJECTION, EMULSION INTRAVENOUS at 04:12

## 2024-12-02 RX ADMIN — LIDOCAINE HYDROCHLORIDE 50 MG: 20 INJECTION INTRAVENOUS at 04:12

## 2024-12-02 NOTE — TRANSFER OF CARE
"Anesthesia Transfer of Care Note    Patient: Janel Richmond    Procedure(s) Performed: Procedure(s) (LRB):  COLONOSCOPY (N/A)    Patient location: GI    Anesthesia Type: general    Transport from OR: Transported from OR on room air with adequate spontaneous ventilation    Post pain: adequate analgesia    Post assessment: no apparent anesthetic complications    Post vital signs: stable    Level of consciousness: awake    Nausea/Vomiting: no nausea/vomiting    Complications: none    Transfer of care protocol was followed      Last vitals: Visit Vitals  /71   Pulse 94   Temp 36.7 °C (98 °F) (Oral)   Resp 20   Ht 5' 4" (1.626 m)   Wt 75 kg (165 lb 6.4 oz)   SpO2 99%   Breastfeeding No   BMI 28.39 kg/m²     "

## 2024-12-02 NOTE — PLAN OF CARE
Pt back to baseline. A&O X4. Follows commands. Tolerating oral fluids.performs self care and dressed self. Pt and family educated on post op care. Discharge instructions handout given to patient. All questions and concerns were addressed. Dr. Rooney at the bedside

## 2024-12-02 NOTE — ANESTHESIA POSTPROCEDURE EVALUATION
Anesthesia Post Evaluation    Patient: Janel Richmond    Procedure(s) Performed: Procedure(s) (LRB):  COLONOSCOPY (N/A)    Final Anesthesia Type: general      Patient location during evaluation: GI PACU  Patient participation: Yes- Able to Participate  Level of consciousness: awake and alert  Post-procedure vital signs: reviewed and stable  Pain management: adequate  Airway patency: patent    PONV status at discharge: No PONV  Anesthetic complications: no      Cardiovascular status: blood pressure returned to baseline  Respiratory status: unassisted  Hydration status: euvolemic  Follow-up not needed.              Vitals Value Taken Time   /71 12/02/24 1339   Temp 36.7 °C (98 °F) 12/02/24 1338   Pulse 94 12/02/24 1339   Resp 20 12/02/24 1338   SpO2 99 % 12/02/24 1552         No case tracking events are documented in the log.      Pain/Leslie Score: No data recorded

## 2024-12-02 NOTE — H&P
[facility]  Pre-OP H&P    SUBJECTIVE:  Janel Richmond is a 59 y.o. y/o female w/ PMHx of small bowel fistulizing Crohn's disease s/p ileocecectomy in 2016  who presents today for scheduled Colonoscopy        OBJECTIVE:  There were no vitals filed for this visit.    General: female in NAD. Not toxic appearing.   HENT: EOM intact.   Pulmonary: No respiratory distress. Effort normal.  Cardiovascular: Regular rate.   Abdomen: soft, non-tender, non-distended      ASSESSMENT/PLAN:    Janel Richmond is a 59 y.o. y/o female who presents today for above stated scheduled procedure     -All questions answered   -Patient consented for Colonoscopy         Charbel Daniels MD   PGY-2   [employer] General Surgery

## 2024-12-02 NOTE — PROVATION PATIENT INSTRUCTIONS
Discharge Summary/Instructions after an Endoscopic Procedure  Patient Name: Janel Richmond  Patient MRN: [id number]  Patient YOB: 1965 Monday, December 2, 2024  Izzy Rooney MD  Dear patient,  As a result of recent federal legislation (The Federal Cures Act), you may   receive lab or pathology results from your procedure in your [facility]   account before your physician is able to contact you. Your physician or   their representative will relay the results to you with their   recommendations at their soonest availability.  Thank you,  RESTRICTIONS:  During your procedure today, you received medications for sedation.  These   medications may affect your judgment, balance and coordination.  Therefore,   for 24 hours, you have the following restrictions:   - DO NOT drive a car, operate machinery, make legal/financial decisions,   sign important papers or drink alcohol.    ACTIVITY:  Today: no heavy lifting, straining or running due to procedural   sedation/anesthesia.  The following day: return to full activity including work.  DIET:  Eat and drink normally unless instructed otherwise.     TREATMENT FOR COMMON SIDE EFFECTS:  - Mild abdominal pain, nausea, belching, bloating or excessive gas:  rest,   eat lightly and use a heating pad.  - Sore Throat: treat with throat lozenges and/or gargle with warm salt   water.  - Because air was used during the procedure, expelling large amounts of air   from your rectum or belching is normal.  - If a bowel prep was taken, you may not have a bowel movement for 1-3 days.    This is normal.  SYMPTOMS TO WATCH FOR AND REPORT TO YOUR PHYSICIAN:  1. Abdominal pain or bloating, other than gas cramps.  2. Chest pain.  3. Back pain.  4. Signs of infection such as: chills or fever occurring within 24 hours   after the procedure.  5. Rectal bleeding, which would show as bright red, maroon, or black stools.   (A tablespoon of blood from the rectum is not serious,  especially if   hemorrhoids are present.)  6. Vomiting.  7. Weakness or dizziness.  GO DIRECTLY TO THE NEAREST EMERGENCY ROOM IF YOU HAVE ANY OF THE FOLLOWING:      Difficulty breathing              Chills and/or fever over 101 F   Persistent vomiting and/or vomiting blood   Severe abdominal pain   Severe chest pain   Black, tarry stools   Bleeding- more than one tablespoon   Any other symptom or condition that you feel may need urgent attention  Your doctor recommends these additional instructions:  If any biopsies were taken, your doctors clinic will contact you in 1 to 2   weeks with any results.  - Patient has a contact number available for emergencies.  The signs and   symptoms of potential delayed complications were discussed with the   patient.  Return to normal activities tomorrow.  Written discharge   instructions were provided to the patient.   - Discharge patient to home.   - Resume previous diet.   - Continue present medications.   - Return to my office in 6 months.   - Welchol (colesevelam HCl) 3 tablets PO BID or colestipol for diarrhea.    Continue Humira.   Diarrhea will also improve with better control of diabetes.   - No recommendation at this time regarding repeat colonoscopy.  For questions, problems or results please call your physician - Izzy Rooney MD at Work:  [phone], Work:  [phone].  [facility] , EMERGENCY ROOM PHONE NUMBER: [phone]  IF A COMPLICATION OR EMERGENCY SITUATION ARISES AND YOU ARE UNABLE TO REACH   YOUR PHYSICIAN - GO DIRECTLY TO THE EMERGENCY ROOM.  Izzy Rooney MD  12/2/2024 4:38:08 PM  This report has been verified and signed electronically.  Dear patient,  As a result of recent federal legislation (The Federal Cures Act), you may   receive lab or pathology results from your procedure in your [facility]   account before your physician is able to contact you. Your physician or   their representative will relay the results  to you with their   recommendations at their soonest availability.  Thank you,  PROVATION

## 2024-12-03 VITALS
HEIGHT: 64 IN | OXYGEN SATURATION: 98 % | HEART RATE: 63 BPM | WEIGHT: 165.38 LBS | RESPIRATION RATE: 17 BRPM | TEMPERATURE: 98 F | DIASTOLIC BLOOD PRESSURE: 70 MMHG | BODY MASS INDEX: 28.24 KG/M2 | SYSTOLIC BLOOD PRESSURE: 101 MMHG

## 2024-12-04 RX ORDER — MONTELUKAST SODIUM 4 MG/1
1 TABLET, CHEWABLE ORAL 2 TIMES DAILY
Qty: 180 TABLET | Refills: 3 | Status: SHIPPED | OUTPATIENT
Start: 2024-12-04 | End: 2025-12-04

## 2025-02-06 ENCOUNTER — OFFICE VISIT (OUTPATIENT)
Dept: INTERNAL MEDICINE | Facility: CLINIC | Age: 60
End: 2025-02-06

## 2025-02-06 VITALS
SYSTOLIC BLOOD PRESSURE: 116 MMHG | HEART RATE: 98 BPM | OXYGEN SATURATION: 98 % | HEIGHT: 64 IN | BODY MASS INDEX: 27.54 KG/M2 | TEMPERATURE: 98 F | RESPIRATION RATE: 18 BRPM | WEIGHT: 161.31 LBS | DIASTOLIC BLOOD PRESSURE: 79 MMHG

## 2025-02-06 DIAGNOSIS — Z78.9 STATIN INTOLERANCE: Chronic | ICD-10-CM

## 2025-02-06 DIAGNOSIS — Z00.00 WELL ADULT EXAM: ICD-10-CM

## 2025-02-06 DIAGNOSIS — K50.80 CROHN'S DISEASE OF BOTH SMALL AND LARGE INTESTINE WITHOUT COMPLICATION: Chronic | ICD-10-CM

## 2025-02-06 DIAGNOSIS — F17.210 CIGARETTE NICOTINE DEPENDENCE WITHOUT COMPLICATION: Chronic | ICD-10-CM

## 2025-02-06 DIAGNOSIS — E11.65 TYPE 2 DIABETES MELLITUS WITH HYPERGLYCEMIA, WITHOUT LONG-TERM CURRENT USE OF INSULIN: Primary | Chronic | ICD-10-CM

## 2025-02-06 PROCEDURE — 99215 OFFICE O/P EST HI 40 MIN: CPT | Mod: PBBFAC | Performed by: NURSE PRACTITIONER

## 2025-02-06 PROCEDURE — 99214 OFFICE O/P EST MOD 30 MIN: CPT | Mod: S$PBB,,, | Performed by: NURSE PRACTITIONER

## 2025-02-06 NOTE — PROGRESS NOTES
Patient ID: [id number]     Chief Complaint: Follow-up (Lab results)        HPI:     ZHENG Richmond is a 59 y.o. female here today for a follow up. Pt states she has not starting taking insulin as prescribed on prior appt.         Immunizations:   Immunization History   Administered Date(s) Administered    COVID-19, MRNA, LN-S, PF (Pfizer) (Gray Cap) 2022    COVID-19, MRNA, LN-S, PF (Pfizer) (Purple Cap) 2021, 2021, 2021    Hepatitis A / Hepatitis B 2019, 10/29/2019    Influenza - Quadrivalent - PF *Preferred* (6 months and older) 10/18/2023    Influenza - Trivalent - Fluarix, Flulaval, Fluzone, Afluria - PF 10/21/2021    PPD Test 11/10/2014    Pneumococcal Conjugate - 13 Valent 2019    Pneumococcal Polysaccharide - 23 Valent 10/29/2019    Tdap 10/09/2017    Zoster Recombinant 2019, 2021        -------------------------------------    Crohn's disease    Diabetes mellitus    HLD (hyperlipidemia)        Past Surgical History:   Procedure Laterality Date     SECTION      CHOLECYSTECTOMY      colon rescetion  10/14/2013    COLONOSCOPY N/A 2024    Procedure: COLONOSCOPY;  Surgeon: Izzy Rooney MD;  Location: [facility] ENDOSCOPY;  Service: Gastroenterology;  Laterality: N/A;    TONSILLECTOMY, ADENOIDECTOMY         Review of patient's allergies indicates:   Allergen Reactions    Iodine Swelling, Itching and Shortness Of Breath     Other reaction(s): Swelling of throat    Latex Hives, Itching and Swelling    Shellfish containing products Swelling     Other reaction(s): Swelling of throat    Codeine Hives and Itching       Current Outpatient Medications   Medication Instructions    biotin 5,000 mcg TbDL 1 tablet, Daily    colestipoL (COLESTID) 1 g, Oral, 2 times daily, For diarrhea    ezetimibe (ZETIA) 10 mg, Oral, Daily    HUMIRA PEN 40 mg, Subcutaneous, Weekly    hydrOXYzine HCL (ATARAX) 25 mg, Oral, Nightly PRN    insulin lispro (HUMALOG KWIKPEN  INSULIN) 3 Units, Subcutaneous, 3 times daily with meals    LANTUS SOLOSTAR U-100 INSULIN 20 Units, Subcutaneous, Nightly    lisinopriL 10 mg, Oral, Daily    loratadine (CLARITIN) 10 mg, Oral, Daily    metFORMIN (GLUCOPHAGE-XR) 1,000 mg, Oral, With breakfast    multivit with minerals/lutein (MULTIVITAMIN 50 PLUS ORAL) 1 tablet, Daily       Social History     Socioeconomic History    Marital status:    Tobacco Use    Smoking status: Every Day     Current packs/day: 1.00     Average packs/day: 1 pack/day for 22.1 years (22.1 ttl pk-yrs)     Types: Cigarettes     Start date: 1/1/2003    Smokeless tobacco: Never   Substance and Sexual Activity    Alcohol use: Yes     Comment: ocassionally    Drug use: Never    Sexual activity: Yes     Partners: Male     Birth control/protection: Post-menopausal     Social Drivers of Health     Financial Resource Strain: Low Risk  (2/6/2025)    Overall Financial Resource Strain (CARDIA)     Difficulty of Paying Living Expenses: Not hard at all   Food Insecurity: No Food Insecurity (2/6/2025)    Hunger Vital Sign     Worried About Running Out of Food in the Last Year: Never true     Ran Out of Food in the Last Year: Never true   Transportation Needs: No Transportation Needs (2/6/2025)    TRANSPORTATION NEEDS     Transportation : No   Physical Activity: Inactive (2/6/2025)    Exercise Vital Sign     Days of Exercise per Week: 0 days     Minutes of Exercise per Session: 0 min   Stress: No Stress Concern Present (2/6/2025)    [employer] - Occupational Stress Questionnaire     Feeling of Stress : Not at all   Housing Stability: Low Risk  (2/6/2025)    Housing Stability Vital Sign     Unable to Pay for Housing in the Last Year: No     Homeless in the Last Year: No        Family History   Problem Relation Name Age of Onset    Hypertension Father      Diabetes Father      Diabetes Mother      Thyroid cancer Mother      Diabetes Brother      Diabetes Sister    "       Patient Care Team:  Sharita Bridges FNP as PCP - General (Family Medicine)     Subjective:     Review of Systems     See HPI for details    Constitutional: Denies Change in appetite. Denies Chills. Denies Fever. Denies Night sweats.  Eye: Denies Blurred vision. Denies Discharge. Denies Eye pain.  ENT: Denies Decreased hearing. Denies Sore throat. Denies Swollen glands.  Respiratory: Denies Cough. Denies Shortness of breath. Denies Shortness of breath with exertion. Denies Wheezing.  Cardiovascular: DeniesChest pain at rest. Denies Chest pain with exertion. Denies Irregular heartbeat. Denies Palpitations. Denies Edema.  Gastrointestinal: Denies Abdominal pain. DeniesDiarrhea. Denies Nausea. Denies Vomiting. Denies Hematemesis or Hematochezia.  Genitourinary: Denies Dysuria. Denies Urinary frequency. Denies Urinary urgency. Denies Blood in urine.  Endocrine: Denies Cold intolerance. Denies Excessive thirst. Denies Heat intolerance. Denies Weight loss. Denies Weight gain.  Musculoskeletal: Denies Painful joints. Denies Weakness.  Integumentary: Denies Rash. Denies Itching. Denies Dry skin.  Neurologic: Denies Dizziness. Denies Fainting. Denies Headache.  Psychiatric: Denies Depression. Denies Anxiety. Denies Suicidal/Homicidal ideations.    All Other ROS: Negative except as stated in HPI.       Objective:     Visit Vitals  /79 (BP Location: Right arm, Patient Position: Sitting)   Pulse 98   Temp 98.4 °F (36.9 °C) (Oral)   Resp 18   Ht 5' 4" (1.626 m)   Wt 73.2 kg (161 lb 4.8 oz)   SpO2 98%   BMI 27.69 kg/m²       Physical Exam    General: Alert and oriented, No acute distress.  Head: Normocephalic, Atraumatic.  Eye: Pupils are equal, round and reactive to light, Extraocular movements are intact, Sclera non-icteric.  Ears/Nose/Throat: Normal, Mucosa moist,Clear.  Neck/Thyroid: Supple, Non-tender, No carotid bruit, No lymphadenopathy, No JVD, Full range of motion.  Respiratory: Clear to auscultation " bilaterally; No wheezes, rales or rhonchi,Non-labored respirations, Symmetrical chest wall expansion.  Cardiovascular: Regular rate and rhythm, S1/S2 normal, No murmurs, rubs or gallops.  Gastrointestinal: Soft, Non-tender, Non-distended, Normal bowel sounds, No palpable organomegaly.  Musculoskeletal: Normal range of motion.  Integumentary: Warm, Dry, Intact, No suspicious lesions or rashes.  Extremities: No clubbing, cyanosis or edema  Neurologic: No focal deficits, Cranial Nerves II-XII are grossly intact, Motor strength normal upper and lower extremities, Sensory exam intact.  Psychiatric: Normal interaction, Coherent speech, Euthymic mood, Appropriate affect       Labs Reviewed:     Chemistry:  Lab Results   Component Value Date     09/09/2024    K 4.1 09/09/2024    BUN 16.2 09/09/2024    CREATININE 1.04 (H) 09/09/2024    EGFRNORACEVR >60 09/09/2024    GLUCOSE 212 (H) 09/09/2024    CALCIUM 9.6 09/09/2024    ALKPHOS 85 09/09/2024    LABPROT 7.6 09/09/2024    ALBUMIN 3.6 09/09/2024    BILIDIR 0.2 11/22/2021    IBILI 0.40 11/22/2021    AST 11 09/09/2024    ALT 14 09/09/2024    RPEDPPRF39WM 25 (L) 07/23/2024        Lab Results   Component Value Date    HGBA1C 10.7 (H) 09/09/2024        Hematology:  Lab Results   Component Value Date    WBC 10.56 09/09/2024    HGB 13.0 09/09/2024    HCT 39.4 09/09/2024     (H) 09/09/2024       Lipid Panel:  Lab Results   Component Value Date    CHOL 179 09/09/2024    HDL 47 09/09/2024    LDL 90.00 09/09/2024    TRIG 212 (H) 09/09/2024    TOTALCHOLEST 4 09/09/2024        Urine:  Lab Results   Component Value Date    APPEARANCEUA Clear 06/12/2024    SGUA 1.032 (H) 06/12/2024    PROTEINUA 1+ (A) 06/12/2024    KETONESUA Trace (A) 06/12/2024    LEUKOCYTESUR 25 (A) 06/12/2024    RBCUA 0-5 06/12/2024    WBCUA 0-5 06/12/2024    BACTERIA Many (A) 06/12/2024    SQEPUA Moderate (A) 06/12/2024    HYALINECASTS 6-10 (A) 06/12/2024    CREATRANDUR 262.0 (H) 06/12/2024    PROTEINURINE  28.7 10/29/2019        Assessment:       ICD-10-CM ICD-9-CM   1. Type 2 diabetes mellitus with hyperglycemia, without long-term current use of insulin  E11.65 250.00     790.29   2. Crohn's disease of both small and large intestine without complication  K50.80 555.2   3. Statin intolerance  Z78.9 995.27   4. Cigarette nicotine dependence without complication  F17.210 305.1   5. Well adult exam  Z00.00 V70.0        Plan:     1. Type 2 diabetes mellitus with hyperglycemia, without long-term current use of insulin (Primary)  POC A1c today13.2 up from 10. ADA diet and exercise. Pt states Metformin causing too much diarrhea and with crohn's its even worse. Informed pt Metformin increases insulin sensitivity and recommend she at least take med daily so will decrease Metformin dosage to 500 mg XR 1 tab po Q hs with meal. Pt was started on Lantus 20 units injected sq Q hs however she never started medication. Pt was started on Novolin at last visit but never started medication.   Urine microalbumin 6-12-24. DM eye exam 6-12-24. DM foot exam done 9-9-24.     2. Crohn's disease of both small and large intestine without complication  Pt followed in GI cl. Keep appt. Cont Humira as prescribed per GI.     3. Statin intolerance  Pt intolerant to statin therapy. Low fat diet and exercise encouraged. Cont Zetia as prescribed.     4. Cigarette nicotine dependence without complication  Encouraged smoking cessation. Education provided.     5. Well adult exam  Labs in 1 month. Pt refusing MMG at this time due to currently self pay and has financial issues with children at present. Will readdress MMG at next visit.  Pt followed in GYN cl for pap smears. Keep appts. UTD Colonoscopy 12-2-24.          Follow up in about 1 month (around 3/6/2025) for with labs 1 week prior to appt. . In addition to their scheduled follow up, the patient has also been instructed to follow up on as needed basis.     Future Appointments   Date Time Provider  Department Center   3/18/2025  7:50 AM Jazlyn Cheek ANP [facility] GYN [place]   5/6/2025  2:30 PM Izzy Rooney MD [facility] GASTRO [place]        Sharita Bridges, [facility]

## 2025-03-14 DIAGNOSIS — K50.80 CROHN'S DISEASE OF BOTH SMALL AND LARGE INTESTINE WITHOUT COMPLICATION: ICD-10-CM

## 2025-03-14 DIAGNOSIS — I10 ESSENTIAL HYPERTENSION: ICD-10-CM

## 2025-03-14 RX ORDER — ADALIMUMAB 40MG/0.8ML
40 KIT SUBCUTANEOUS WEEKLY
Qty: 4 PEN | Refills: 11 | Status: SHIPPED | OUTPATIENT
Start: 2025-03-14 | End: 2026-03-14

## 2025-03-18 RX ORDER — LISINOPRIL 10 MG/1
10 TABLET ORAL DAILY
Qty: 30 TABLET | Refills: 2 | Status: SHIPPED | OUTPATIENT
Start: 2025-03-18

## 2025-05-08 DIAGNOSIS — I10 ESSENTIAL HYPERTENSION: ICD-10-CM

## 2025-05-10 RX ORDER — LISINOPRIL 10 MG/1
10 TABLET ORAL DAILY
Qty: 30 TABLET | Refills: 2 | Status: SHIPPED | OUTPATIENT
Start: 2025-05-10

## 2025-05-26 DIAGNOSIS — K50.80 CROHN'S DISEASE OF BOTH SMALL AND LARGE INTESTINE WITHOUT COMPLICATION: ICD-10-CM

## 2025-05-26 RX ORDER — ADALIMUMAB 40MG/0.8ML
40 KIT SUBCUTANEOUS WEEKLY
Qty: 4 PEN | Refills: 11 | Status: SHIPPED | OUTPATIENT
Start: 2025-05-26 | End: 2026-05-26

## 2025-06-04 RX ORDER — METFORMIN HYDROCHLORIDE 500 MG/1
1000 TABLET, EXTENDED RELEASE ORAL
Qty: 60 TABLET | Refills: 1 | Status: SHIPPED | OUTPATIENT
Start: 2025-06-04

## 2025-07-07 ENCOUNTER — LAB VISIT (OUTPATIENT)
Dept: LAB | Facility: HOSPITAL | Age: 60
End: 2025-07-07
Attending: NURSE PRACTITIONER

## 2025-07-07 DIAGNOSIS — E55.9 VITAMIN D DEFICIENCY: Chronic | ICD-10-CM

## 2025-07-07 DIAGNOSIS — E11.65 TYPE 2 DIABETES MELLITUS WITH HYPERGLYCEMIA, WITHOUT LONG-TERM CURRENT USE OF INSULIN: Primary | Chronic | ICD-10-CM

## 2025-07-07 DIAGNOSIS — R31.9 HEMATURIA, UNSPECIFIED TYPE: ICD-10-CM

## 2025-07-07 DIAGNOSIS — E11.65 TYPE 2 DIABETES MELLITUS WITH HYPERGLYCEMIA, WITHOUT LONG-TERM CURRENT USE OF INSULIN: Chronic | ICD-10-CM

## 2025-07-07 LAB
25(OH)D3+25(OH)D2 SERPL-MCNC: 28 NG/ML (ref 30–80)
ANION GAP SERPL CALC-SCNC: 12 MEQ/L
BACTERIA #/AREA URNS AUTO: ABNORMAL /HPF
BILIRUB UR QL STRIP.AUTO: NEGATIVE
BUN SERPL-MCNC: 14.8 MG/DL (ref 9.8–20.1)
CALCIUM SERPL-MCNC: 9.4 MG/DL (ref 8.4–10.2)
CHLORIDE SERPL-SCNC: 107 MMOL/L (ref 98–107)
CHOLEST SERPL-MCNC: 250 MG/DL
CHOLEST/HDLC SERPL: 4 {RATIO} (ref 0–5)
CLARITY UR: ABNORMAL
CO2 SERPL-SCNC: 18 MMOL/L (ref 23–31)
COLOR UR AUTO: YELLOW
CREAT SERPL-MCNC: 0.95 MG/DL (ref 0.55–1.02)
CREAT/UREA NIT SERPL: 16
EST. AVERAGE GLUCOSE BLD GHB EST-MCNC: 214.5 MG/DL
GFR SERPLBLD CREATININE-BSD FMLA CKD-EPI: >60 ML/MIN/1.73/M2
GLUCOSE SERPL-MCNC: 295 MG/DL (ref 82–115)
GLUCOSE UR QL STRIP: ABNORMAL
HBA1C MFR BLD: 9.1 %
HDLC SERPL-MCNC: 59 MG/DL (ref 35–60)
HGB UR QL STRIP: NEGATIVE
HYALINE CASTS #/AREA URNS LPF: ABNORMAL /LPF
KETONES UR QL STRIP: NEGATIVE
LDLC SERPL CALC-MCNC: 120 MG/DL (ref 50–140)
LEUKOCYTE ESTERASE UR QL STRIP: 25
MUCOUS THREADS URNS QL MICRO: ABNORMAL /LPF
NITRITE UR QL STRIP: NEGATIVE
PH UR STRIP: 5 [PH]
POTASSIUM SERPL-SCNC: 4.1 MMOL/L (ref 3.5–5.1)
PROT UR QL STRIP: NEGATIVE
RBC #/AREA URNS AUTO: ABNORMAL /HPF
SODIUM SERPL-SCNC: 137 MMOL/L (ref 136–145)
SP GR UR STRIP.AUTO: 1.04 (ref 1–1.03)
SQUAMOUS #/AREA URNS LPF: ABNORMAL /HPF
TRIGL SERPL-MCNC: 354 MG/DL (ref 37–140)
UROBILINOGEN UR STRIP-ACNC: NORMAL
VLDLC SERPL CALC-MCNC: 71 MG/DL
WBC #/AREA URNS AUTO: ABNORMAL /HPF
YEAST BUDDING URNS QL: ABNORMAL /HPF

## 2025-07-07 PROCEDURE — 80061 LIPID PANEL: CPT

## 2025-07-07 PROCEDURE — 36415 COLL VENOUS BLD VENIPUNCTURE: CPT

## 2025-07-07 PROCEDURE — 80048 BASIC METABOLIC PNL TOTAL CA: CPT

## 2025-07-07 PROCEDURE — 88108 CYTOPATH CONCENTRATE TECH: CPT | Mod: TC

## 2025-07-07 PROCEDURE — 83036 HEMOGLOBIN GLYCOSYLATED A1C: CPT

## 2025-07-07 PROCEDURE — 81001 URINALYSIS AUTO W/SCOPE: CPT

## 2025-07-07 PROCEDURE — 82306 VITAMIN D 25 HYDROXY: CPT

## 2025-07-08 ENCOUNTER — OFFICE VISIT (OUTPATIENT)
Dept: INTERNAL MEDICINE | Facility: CLINIC | Age: 60
End: 2025-07-08

## 2025-07-08 VITALS
HEIGHT: 64 IN | SYSTOLIC BLOOD PRESSURE: 123 MMHG | BODY MASS INDEX: 26.8 KG/M2 | HEART RATE: 80 BPM | WEIGHT: 157 LBS | TEMPERATURE: 99 F | RESPIRATION RATE: 18 BRPM | DIASTOLIC BLOOD PRESSURE: 81 MMHG

## 2025-07-08 DIAGNOSIS — Z00.00 WELL ADULT EXAM: ICD-10-CM

## 2025-07-08 DIAGNOSIS — Z78.9 STATIN INTOLERANCE: Chronic | ICD-10-CM

## 2025-07-08 DIAGNOSIS — H57.89 EYE DRAINAGE: ICD-10-CM

## 2025-07-08 DIAGNOSIS — F17.210 CIGARETTE NICOTINE DEPENDENCE WITHOUT COMPLICATION: Chronic | ICD-10-CM

## 2025-07-08 DIAGNOSIS — I10 ESSENTIAL HYPERTENSION: ICD-10-CM

## 2025-07-08 DIAGNOSIS — E55.9 VITAMIN D DEFICIENCY: Chronic | ICD-10-CM

## 2025-07-08 DIAGNOSIS — Z12.31 ENCOUNTER FOR SCREENING MAMMOGRAM FOR MALIGNANT NEOPLASM OF BREAST: ICD-10-CM

## 2025-07-08 DIAGNOSIS — B37.49 YEAST UTI: ICD-10-CM

## 2025-07-08 DIAGNOSIS — F17.200 NEEDS SMOKING CESSATION EDUCATION: ICD-10-CM

## 2025-07-08 DIAGNOSIS — E11.65 TYPE 2 DIABETES MELLITUS WITH HYPERGLYCEMIA, WITHOUT LONG-TERM CURRENT USE OF INSULIN: Primary | Chronic | ICD-10-CM

## 2025-07-08 DIAGNOSIS — K50.80 CROHN'S DISEASE OF BOTH SMALL AND LARGE INTESTINE WITHOUT COMPLICATION: Chronic | ICD-10-CM

## 2025-07-08 DIAGNOSIS — E11.9 TYPE 2 DIABETES MELLITUS WITHOUT COMPLICATION, WITHOUT LONG-TERM CURRENT USE OF INSULIN: ICD-10-CM

## 2025-07-08 LAB
ESTROGEN SERPL-MCNC: NORMAL PG/ML
INSULIN SERPL-ACNC: NORMAL U[IU]/ML
LAB AP CLINICAL INFORMATION: NORMAL
LAB AP GROSS DESCRIPTION: NORMAL
LAB AP URINE CYTOLOGY INTERPRETATION SPECIMEN 1: NORMAL

## 2025-07-08 PROCEDURE — 99214 OFFICE O/P EST MOD 30 MIN: CPT | Mod: PBBFAC | Performed by: NURSE PRACTITIONER

## 2025-07-08 RX ORDER — LISINOPRIL 10 MG/1
10 TABLET ORAL DAILY
Qty: 30 TABLET | Refills: 2 | Status: SHIPPED | OUTPATIENT
Start: 2025-07-08

## 2025-07-08 RX ORDER — FLUCONAZOLE 150 MG/1
150 TABLET ORAL ONCE
Qty: 1 TABLET | Refills: 0 | Status: SHIPPED | OUTPATIENT
Start: 2025-07-08 | End: 2025-07-08

## 2025-07-08 RX ORDER — EZETIMIBE 10 MG/1
10 TABLET ORAL DAILY
Qty: 90 TABLET | Refills: 1 | Status: SHIPPED | OUTPATIENT
Start: 2025-07-08

## 2025-07-08 RX ORDER — METFORMIN HYDROCHLORIDE 500 MG/1
1000 TABLET, EXTENDED RELEASE ORAL
Qty: 60 TABLET | Refills: 3 | Status: SHIPPED | OUTPATIENT
Start: 2025-07-08

## 2025-07-08 RX ORDER — INSULIN GLARGINE 100 [IU]/ML
20 INJECTION, SOLUTION SUBCUTANEOUS NIGHTLY
Qty: 18 ML | Refills: 3 | Status: SHIPPED | OUTPATIENT
Start: 2025-07-08 | End: 2026-07-08

## 2025-07-08 RX ORDER — INSULIN LISPRO 100 [IU]/ML
3 INJECTION, SOLUTION INTRAVENOUS; SUBCUTANEOUS
Qty: 2.7 ML | Refills: 3 | Status: SHIPPED | OUTPATIENT
Start: 2025-07-08 | End: 2026-07-08

## 2025-07-08 RX ORDER — POLYMYXIN B SULFATE AND TRIMETHOPRIM 1; 10000 MG/ML; [USP'U]/ML
1 SOLUTION OPHTHALMIC EVERY 6 HOURS
Qty: 10 ML | Refills: 0 | Status: SHIPPED | OUTPATIENT
Start: 2025-07-08 | End: 2025-07-15

## 2025-07-08 NOTE — PROGRESS NOTES
Patient ID: [id number]     Chief Complaint: Diabetes        HPI:     HPI      Janel Richmond is a 59 y.o. female here today for a follow up. PT NS appt 3-14-25, Err 3-12-25, NS 3-6-25. Pt refusing MMG due to lack of insurance.         Immunizations:   Immunization History   Administered Date(s) Administered    COVID-19, MRNA, LN-S, PF (Pfizer) (Gray Cap) 2022    COVID-19, MRNA, LN-S, PF (Pfizer) (Purple Cap) 2021, 2021, 2021    Hepatitis A / Hepatitis B 2019, 10/29/2019    Influenza - Quadrivalent - PF *Preferred* (6 months and older) 10/18/2023    Influenza - Trivalent - Fluarix, Flulaval, Fluzone, Afluria - PF 10/21/2021    PPD Test 11/10/2014    Pneumococcal Conjugate - 13 Valent 2019    Pneumococcal Polysaccharide - 23 Valent 10/29/2019    Tdap 10/09/2017    Zoster Recombinant 2019, 2021        -------------------------------------    Crohn's disease    Diabetes mellitus    HLD (hyperlipidemia)        Past Surgical History:   Procedure Laterality Date     SECTION      CHOLECYSTECTOMY      colon rescetion  10/14/2013    COLONOSCOPY N/A 2024    Procedure: COLONOSCOPY;  Surgeon: Izzy Rooney MD;  Location: [facility] ENDOSCOPY;  Service: Gastroenterology;  Laterality: N/A;    TONSILLECTOMY, ADENOIDECTOMY         Review of patient's allergies indicates:   Allergen Reactions    Iodine Swelling, Itching and Shortness Of Breath     Other reaction(s): Swelling of throat    Latex Hives, Itching and Swelling    Shellfish containing products Swelling     Other reaction(s): Swelling of throat    Codeine Hives and Itching       Current Outpatient Medications   Medication Instructions    biotin 5,000 mcg TbDL 1 tablet, Daily    colestipoL (COLESTID) 1 g, Oral, 2 times daily, For diarrhea    ezetimibe (ZETIA) 10 mg, Oral, Daily    HUMIRA PEN 40 mg, Subcutaneous, Weekly    hydrOXYzine HCL (ATARAX) 25 mg, Oral, Nightly PRN    insulin lispro (HUMALOG KWIKPEN  INSULIN) 3 Units, Subcutaneous, 3 times daily with meals    LANTUS SOLOSTAR U-100 INSULIN 20 Units, Subcutaneous, Nightly    lisinopriL 10 mg, Oral, Daily    loratadine (CLARITIN) 10 mg, Oral, Daily    metFORMIN (GLUCOPHAGE-XR) 1,000 mg, Oral, With breakfast    multivit with minerals/lutein (MULTIVITAMIN 50 PLUS ORAL) 1 tablet, Daily       Social History[1]     Family History   Problem Relation Name Age of Onset    Hypertension Father      Diabetes Father      Diabetes Mother      Thyroid cancer Mother      Diabetes Brother      Diabetes Sister          Patient Care Team:  Sharita Bridges FNP as PCP - General (Family Medicine)     Subjective:     Review of Systems     See HPI for details    Constitutional: Denies Change in appetite. Denies Chills. Denies Fever. Denies Night sweats.  Eye: Denies Blurred vision. Denies Discharge. Denies Eye pain.  ENT: Denies Decreased hearing. Denies Sore throat. Denies Swollen glands.  Respiratory: Denies Cough. Denies Shortness of breath. Denies Shortness of breath with exertion. Denies Wheezing.  Cardiovascular: DeniesChest pain at rest. Denies Chest pain with exertion. Denies Irregular heartbeat. Denies Palpitations. Denies Edema.  Gastrointestinal: Denies Abdominal pain. DeniesDiarrhea. Denies Nausea. Denies Vomiting. Denies Hematemesis or Hematochezia.  Genitourinary: Denies Dysuria. Denies Urinary frequency. Denies Urinary urgency. Denies Blood in urine.  Endocrine: Denies Cold intolerance. Denies Excessive thirst. Denies Heat intolerance. Denies Weight loss. Denies Weight gain.  Musculoskeletal: Denies Painful joints. Denies Weakness.  Integumentary: Denies Rash. Denies Itching. Denies Dry skin.  Neurologic: Denies Dizziness. Denies Fainting. Denies Headache.  Psychiatric: Denies Depression. Denies Anxiety. Denies Suicidal/Homicidal ideations.    All Other ROS: Negative except as stated in HPI.       Objective:     Visit Vitals  /81   Pulse 80   Temp 98.6 °F (37 °C)  "(Oral)   Resp 18   Ht 5' 4" (1.626 m)   Wt 71.2 kg (157 lb)   BMI 26.95 kg/m²       Physical Exam    General: Alert and oriented, No acute distress.  Head: Normocephalic, Atraumatic.  Eye: Pupils are equal, round and reactive to light, Extraocular movements are intact, Sclera non-icteric.  Ears/Nose/Throat: Normal, Mucosa moist,Clear.  Neck/Thyroid: Supple, Non-tender, No carotid bruit, No lymphadenopathy, No JVD, Full range of motion.  Respiratory: Clear to auscultation bilaterally; No wheezes, rales or rhonchi,Non-labored respirations, Symmetrical chest wall expansion.  Cardiovascular: Regular rate and rhythm, S1/S2 normal, No murmurs, rubs or gallops.  Gastrointestinal: Soft, Non-tender, Non-distended, Normal bowel sounds, No palpable organomegaly.  Musculoskeletal: Normal range of motion.  Integumentary: Warm, Dry, Intact, No suspicious lesions or rashes.  Extremities: No clubbing, cyanosis or edema  Neurologic: No focal deficits, Cranial Nerves II-XII are grossly intact, Motor strength normal upper and lower extremities, Sensory exam intact.  Psychiatric: Normal interaction, Coherent speech, Euthymic mood, Appropriate affect       Labs Reviewed:     Chemistry:  Lab Results   Component Value Date     07/07/2025    K 4.1 07/07/2025    BUN 14.8 07/07/2025    CREATININE 0.95 07/07/2025    EGFRNORACEVR >60 07/07/2025    CALCIUM 9.4 07/07/2025    ALKPHOS 85 09/09/2024    ALBUMIN 3.6 09/09/2024    BILIDIR 0.2 11/22/2021    IBILI 0.40 11/22/2021    AST 11 09/09/2024    ALT 14 09/09/2024    [id number] 28 (L) 07/07/2025        Lab Results   Component Value Date    HGBA1C 9.1 (H) 07/07/2025        Hematology:  Lab Results   Component Value Date    WBC 10.56 09/09/2024    HGB 13.0 09/09/2024    HCT 39.4 09/09/2024     (H) 09/09/2024       Lipid Panel:  Lab Results   Component Value Date    CHOL 250 (H) 07/07/2025    HDL 59 07/07/2025    .00 07/07/2025    TRIG 354 (H) 07/07/2025    TOTALCHOLEST 4 " 07/07/2025        Urine:  Lab Results   Component Value Date    APPEARANCEUA Turbid (A) 07/07/2025    SGUA 1.037 (H) 07/07/2025    PROTEINUA Negative 07/07/2025    KETONESUA Negative 07/07/2025    LEUKOCYTESUR 25 (A) 07/07/2025    RBCUA 0-5 07/07/2025    WBCUA 6-10 (A) 07/07/2025    BACTERIA Few (A) 07/07/2025    SQEPUA Many (A) 07/07/2025    HYALINECASTS 0-2 (A) 07/07/2025    CREATRANDUR 262.0 (H) 06/12/2024    PROTEINURINE 28.7 10/29/2019        Assessment:          Plan:     1. Type 2 diabetes mellitus with hyperglycemia, without long-term current use of insulin (Primary)  A1c 9.1 down from 10.7. ADA diet and exercise. Pt states she was not fasting at time of blood collection. Pt states she stopped taking all meds due to having low blood sugars- states has been off meds X 3 weeks. Cont Metformin as prescribed. Restart Lantus to 20 units sq q hs. Cont Humalog 3 units TID with meals. A1c in 3 months. Urine microalbumin 6-12-24- will get with next labs. DM foot exam done 9-9-24. DM eye exam done with [facility] in [place] a few months ago- will get copy of results sent to pt's chart.     2. Crohn's disease of both small and large intestine without complication  Pt followed in GI cl. Keep appt. Cont Humira as prescribed per GI.     3. Statin intolerance  Pt intolerant to statin therapy. Low fat diet and exercise encouraged. Cont Zetia as prescribed.     4. Cigarette nicotine dependence without complication  Encouraged smoking cessation. Education provided.     5. Well adult exam  Labs in 3 months. Pt refusing MMG at this time due to currently self pay and has financial issues with children at present. Will readdress MMG at next visit.  Pt followed in GYN cl for pap smears. Keep appts. UTD Colonoscopy 12-2-24.     6. Vitamin D deficiency  Vit D level 28 up from 25. Encouraged Vit D3 2000 IU OTC 1 tab po daily. Vit D level in 4 months.     7. Yeast UTI  Yeast noted on UA. Will RX Diflucan 150 mg 1 tab po X 1  dose.     8. Encounter for screening mammogram for malignant neoplasm of breast  Pt refusing MMG at this time due to currently self pay and has financial issues with children at present. Will readdress MMG at next visit.     9. Bilat eye drainage  RX Polytrim ophthalmic drops instill 1 gtts to bilat eyes every 6 hours X 7 days.       Follow up in about 3 months (around 10/8/2025) for with labs 1 week prior to appt. . In addition to their scheduled follow up, the patient has also been instructed to follow up on as needed basis.     Future Appointments   Date Time Provider Department Center   7/30/2025  7:50 AM Jazlyn Cheek ANP [facility] GYN [place]    12/9/2025 10:45 AM Izzy Rooney MD [facility] GASTRO [place]        [facility]             [1]   Social History  Socioeconomic History    Marital status:    Tobacco Use    Smoking status: Every Day     Current packs/day: 1.00     Average packs/day: 1 pack/day for 10.5 years (10.5 ttl pk-yrs)     Types: Cigarettes     Start date: 1/1/2015    Smokeless tobacco: Never   Substance and Sexual Activity    Alcohol use: Yes     Comment: ocassionally    Drug use: Never    Sexual activity: Yes     Partners: Male     Birth control/protection: Post-menopausal     Social Drivers of Health     Financial Resource Strain: Low Risk  (2/6/2025)    Overall Financial Resource Strain (CARDIA)     Difficulty of Paying Living Expenses: Not hard at all   Food Insecurity: No Food Insecurity (2/6/2025)    Hunger Vital Sign     Worried About Running Out of Food in the Last Year: Never true     Ran Out of Food in the Last Year: Never true   Transportation Needs: No Transportation Needs (2/6/2025)    TRANSPORTATION NEEDS     Transportation : No   Physical Activity: Inactive (2/6/2025)    Exercise Vital Sign     Days of Exercise per Week: 0 days     Minutes of Exercise per Session: 0 min   Stress: No Stress Concern Present (2/6/2025)    [employer] [place] [employer] - Occupational Stress Questionnaire     Feeling of Stress : Not at all   Housing Stability: Unknown (2/6/2025)    Housing Stability Vital Sign     Unable to Pay for Housing in the Last Year: No     Homeless in the Last Year: No

## 2025-07-08 NOTE — Clinical Note
Pt had eye exam done at [facility] in [place] a few month ago. Please get copy of results sent to pt's chart.

## 2025-07-19 ENCOUNTER — HOSPITAL ENCOUNTER (EMERGENCY)
Facility: HOSPITAL | Age: 60
Discharge: HOME | End: 2025-07-19
Attending: INTERNAL MEDICINE

## 2025-07-19 VITALS
HEIGHT: 64 IN | RESPIRATION RATE: 17 BRPM | TEMPERATURE: 98 F | OXYGEN SATURATION: 98 % | SYSTOLIC BLOOD PRESSURE: 123 MMHG | BODY MASS INDEX: 28.1 KG/M2 | WEIGHT: 164.63 LBS | DIASTOLIC BLOOD PRESSURE: 82 MMHG | HEART RATE: 87 BPM
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
## 2025-07-19 PROCEDURE — 99283 EMERGENCY DEPT VISIT LOW MDM: CPT

## 2025-07-19 RX ORDER — SULFAMETHOXAZOLE AND TRIMETHOPRIM 400; 80 MG/1; MG/1
1 TABLET ORAL
COMMUNITY
End: 2025-07-19 | Stop reason: ALTCHOICE

## 2025-07-19 RX ORDER — IBUPROFEN 800 MG/1
800 TABLET, FILM COATED ORAL EVERY 6 HOURS PRN
Qty: 20 TABLET | Refills: 0 | Status: SHIPPED | OUTPATIENT
Start: 2025-07-19

## 2025-07-19 RX ORDER — SULFAMETHOXAZOLE AND TRIMETHOPRIM 800; 160 MG/1; MG/1
1 TABLET ORAL 2 TIMES DAILY
Qty: 14 TABLET | Refills: 0 | Status: SHIPPED | OUTPATIENT
Start: 2025-07-19 | End: 2025-07-26

## 2025-07-20 ENCOUNTER — HOSPITAL ENCOUNTER (EMERGENCY)
Facility: HOSPITAL | Age: 60
Discharge: HOME | End: 2025-07-20
Attending: INTERNAL MEDICINE

## 2025-07-20 VITALS
BODY MASS INDEX: 28.7 KG/M2 | SYSTOLIC BLOOD PRESSURE: 124 MMHG | OXYGEN SATURATION: 96 % | TEMPERATURE: 98 F | HEIGHT: 63 IN | RESPIRATION RATE: 20 BRPM | DIASTOLIC BLOOD PRESSURE: 53 MMHG | HEART RATE: 74 BPM | WEIGHT: 162 LBS

## 2025-07-20 DIAGNOSIS — L02.01 FACIAL ABSCESS: Primary | ICD-10-CM

## 2025-07-20 LAB
ALBUMIN SERPL-MCNC: 4.2 G/DL (ref 3.4–4.8)
ALBUMIN/GLOB SERPL: 0.9 RATIO (ref 1.1–2)
ALP SERPL-CCNC: 82 UNIT/L (ref 40–150)
ALT SERPL-CCNC: 17 UNIT/L (ref 0–55)
ANION GAP SERPL CALC-SCNC: 12 MEQ/L
AST SERPL-CCNC: 17 UNIT/L (ref 11–45)
BACTERIA #/AREA URNS AUTO: ABNORMAL /HPF
BASOPHILS # BLD AUTO: 0.07 X10(3)/MCL
BASOPHILS NFR BLD AUTO: 0.5 %
BILIRUB SERPL-MCNC: 0.5 MG/DL
BILIRUB UR QL STRIP.AUTO: NEGATIVE
BUN SERPL-MCNC: 15 MG/DL (ref 9.8–20.1)
CALCIUM SERPL-MCNC: 10.2 MG/DL (ref 8.4–10.2)
CHLORIDE SERPL-SCNC: 104 MMOL/L (ref 98–107)
CLARITY UR: CLEAR
CO2 SERPL-SCNC: 21 MMOL/L (ref 23–31)
COLOR UR AUTO: YELLOW
CREAT SERPL-MCNC: 1.12 MG/DL (ref 0.55–1.02)
CREAT/UREA NIT SERPL: 13
EOSINOPHIL # BLD AUTO: 0.4 X10(3)/MCL (ref 0–0.9)
EOSINOPHIL NFR BLD AUTO: 3.1 %
ERYTHROCYTE [DISTWIDTH] IN BLOOD BY AUTOMATED COUNT: 14.1 % (ref 11.5–17)
GFR SERPLBLD CREATININE-BSD FMLA CKD-EPI: 56 ML/MIN/1.73/M2
GLOBULIN SER-MCNC: 4.7 GM/DL (ref 2.4–3.5)
GLUCOSE SERPL-MCNC: 248 MG/DL (ref 82–115)
GLUCOSE UR QL STRIP: ABNORMAL
HCT VFR BLD AUTO: 46.1 % (ref 37–47)
HGB BLD-MCNC: 15.4 G/DL (ref 12–16)
HGB UR QL STRIP: NEGATIVE
HOLD SPECIMEN: NORMAL
HYALINE CASTS #/AREA URNS LPF: ABNORMAL /LPF
IMM GRANULOCYTES # BLD AUTO: 0.04 X10(3)/MCL (ref 0–0.04)
IMM GRANULOCYTES NFR BLD AUTO: 0.3 %
KETONES UR QL STRIP: NEGATIVE
LACTATE SERPL-SCNC: 2 MMOL/L (ref 0.5–2.2)
LEUKOCYTE ESTERASE UR QL STRIP: 25
LYMPHOCYTES # BLD AUTO: 2.86 X10(3)/MCL (ref 0.6–4.6)
LYMPHOCYTES NFR BLD AUTO: 22.1 %
MCH RBC QN AUTO: 30.9 PG (ref 27–31)
MCHC RBC AUTO-ENTMCNC: 33.4 G/DL (ref 33–36)
MCV RBC AUTO: 92.6 FL (ref 80–94)
MONOCYTES # BLD AUTO: 0.65 X10(3)/MCL (ref 0.1–1.3)
MONOCYTES NFR BLD AUTO: 5 %
MUCOUS THREADS URNS QL MICRO: ABNORMAL /LPF
NEUTROPHILS # BLD AUTO: 8.95 X10(3)/MCL (ref 2.1–9.2)
NEUTROPHILS NFR BLD AUTO: 69 %
NITRITE UR QL STRIP: NEGATIVE
NRBC BLD AUTO-RTO: 0 %
PH UR STRIP: 5.5 [PH]
PLATELET # BLD AUTO: 316 X10(3)/MCL (ref 130–400)
PMV BLD AUTO: 9.7 FL (ref 7.4–10.4)
POTASSIUM SERPL-SCNC: 4.9 MMOL/L (ref 3.5–5.1)
PROT SERPL-MCNC: 8.9 GM/DL (ref 5.8–7.6)
PROT UR QL STRIP: ABNORMAL
RBC # BLD AUTO: 4.98 X10(6)/MCL (ref 4.2–5.4)
RBC #/AREA URNS AUTO: ABNORMAL /HPF
SODIUM SERPL-SCNC: 137 MMOL/L (ref 136–145)
SP GR UR STRIP.AUTO: 1.03 (ref 1–1.03)
SQUAMOUS #/AREA URNS LPF: ABNORMAL /HPF
UROBILINOGEN UR STRIP-ACNC: NORMAL
WBC # BLD AUTO: 12.97 X10(3)/MCL (ref 4.5–11.5)
WBC #/AREA URNS AUTO: ABNORMAL /HPF

## 2025-07-20 PROCEDURE — 96365 THER/PROPH/DIAG IV INF INIT: CPT

## 2025-07-20 PROCEDURE — 85025 COMPLETE CBC W/AUTO DIFF WBC: CPT

## 2025-07-20 PROCEDURE — 25000003 PHARM REV CODE 250

## 2025-07-20 PROCEDURE — 80053 COMPREHEN METABOLIC PANEL: CPT

## 2025-07-20 PROCEDURE — 63600175 PHARM REV CODE 636 W HCPCS

## 2025-07-20 PROCEDURE — 96375 TX/PRO/DX INJ NEW DRUG ADDON: CPT

## 2025-07-20 PROCEDURE — 87040 BLOOD CULTURE FOR BACTERIA: CPT

## 2025-07-20 PROCEDURE — 81001 URINALYSIS AUTO W/SCOPE: CPT

## 2025-07-20 PROCEDURE — 93005 ELECTROCARDIOGRAM TRACING: CPT

## 2025-07-20 PROCEDURE — 10060 I&D ABSCESS SIMPLE/SINGLE: CPT

## 2025-07-20 PROCEDURE — 99284 EMERGENCY DEPT VISIT MOD MDM: CPT | Mod: 25

## 2025-07-20 PROCEDURE — 83605 ASSAY OF LACTIC ACID: CPT

## 2025-07-20 RX ORDER — LIDOCAINE AND PRILOCAINE 25; 25 MG/G; MG/G
CREAM TOPICAL ONCE
Status: COMPLETED | OUTPATIENT
Start: 2025-07-20 | End: 2025-07-20

## 2025-07-20 RX ORDER — CLINDAMYCIN PHOSPHATE 600 MG/50ML
600 INJECTION, SOLUTION INTRAVENOUS
Status: COMPLETED | OUTPATIENT
Start: 2025-07-20 | End: 2025-07-20

## 2025-07-20 RX ORDER — KETOROLAC TROMETHAMINE 30 MG/ML
15 INJECTION, SOLUTION INTRAMUSCULAR; INTRAVENOUS
Status: COMPLETED | OUTPATIENT
Start: 2025-07-20 | End: 2025-07-20

## 2025-07-20 RX ADMIN — LIDOCAINE AND PRILOCAINE: 25; 25 CREAM TOPICAL at 07:07

## 2025-07-20 RX ADMIN — SODIUM CHLORIDE, POTASSIUM CHLORIDE, SODIUM LACTATE AND CALCIUM CHLORIDE 1000 ML: 600; 310; 30; 20 INJECTION, SOLUTION INTRAVENOUS at 07:07

## 2025-07-20 RX ADMIN — CLINDAMYCIN PHOSPHATE 600 MG: 600 INJECTION, SOLUTION INTRAVENOUS at 07:07

## 2025-07-20 RX ADMIN — KETOROLAC TROMETHAMINE 15 MG: 30 INJECTION, SOLUTION INTRAMUSCULAR; INTRAVENOUS at 07:07

## 2025-07-20 NOTE — ED PROVIDER NOTES
Encounter Date: 2025       History     Chief Complaint   Patient presents with    Insect Bite     States stung by wasp on Wednesday.  Is prone to staph so put on Bactrim.  Presents with left inner eye at bottom of brow with redness and edema around center sting.       A 59 y.o. female patient with a history of Crohn's disease, DM, HLD presents to the ED with area of redness, induration, and pain to just between her eyebrows, more towards the left. The onset is worsening for past 4 days after being stung by a wasp. She states her brother is a doctor and prescribed her bactrim. Upon chart review, she was prescribed bactrim 400/80 mg BID. She has been on this for 2 doses and states it is not improving and her brother told her to come here for IV antibiotics. Patient is afebrile and not tachycardic.       The history is provided by the patient.     Review of patient's allergies indicates:   Allergen Reactions    Iodine Swelling, Itching and Shortness Of Breath     Other reaction(s): Swelling of throat    Latex Hives, Itching and Swelling    Shellfish containing products Swelling     Other reaction(s): Swelling of throat    Codeine Hives and Itching     Past Medical History:   Diagnosis Date    Crohn's disease     Diabetes mellitus     HLD (hyperlipidemia)      Past Surgical History:   Procedure Laterality Date    ANKLE FRACTURE SURGERY Right      SECTION      CHOLECYSTECTOMY      colon rescetion  10/14/2013    COLONOSCOPY N/A 2024    Procedure: COLONOSCOPY;  Surgeon: Izzy Rooney MD;  Location: [facility] ENDOSCOPY;  Service: Gastroenterology;  Laterality: N/A;    TONSILLECTOMY, ADENOIDECTOMY       Family History   Problem Relation Name Age of Onset    Hypertension Father      Diabetes Father      Diabetes Mother      Thyroid cancer Mother      Diabetes Brother      Diabetes Sister       Social History[1]  Review of Systems   Constitutional:  Negative for chills and fever.   Eyes:  Negative for  pain and visual disturbance.   Respiratory:  Negative for shortness of breath.    Cardiovascular:  Negative for chest pain.   Gastrointestinal:  Negative for nausea and vomiting.   Genitourinary:  Negative for difficulty urinating and dysuria.   Musculoskeletal:  Negative for arthralgias.   Skin:  Positive for rash. Negative for color change.   Neurological:  Negative for weakness and headaches.   Hematological:  Does not bruise/bleed easily.   Psychiatric/Behavioral:  Negative for confusion.    All other systems reviewed and are negative.      Physical Exam     Initial Vitals [07/19/25 2151]   BP Pulse Resp Temp SpO2   123/82 87 17 98.1 °F (36.7 °C) 98 %      MAP       --         Physical Exam    Nursing note and vitals reviewed.  Constitutional: She appears well-developed and well-nourished.   HENT:   Head: Normocephalic and atraumatic.       Right Ear: External ear normal.   Left Ear: External ear normal.   Nose: Nose normal. Mouth/Throat: Oropharynx is clear and moist.   Area of warmth, erythema, and induration to forehead as graphed, approximately 1x2 cm. TTP. No drainage noted at this time.    Eyes: Conjunctivae and EOM are normal.   Neck: Neck supple.   Cardiovascular:  Normal rate, regular rhythm and normal heart sounds.     Exam reveals no gallop and no friction rub.       No murmur heard.  Pulmonary/Chest: Breath sounds normal. No respiratory distress. She has no wheezes. She has no rhonchi. She has no rales.   Musculoskeletal:      Cervical back: Neck supple.     Neurological: She is alert and oriented to person, place, and time. GCS score is 15. GCS eye subscore is 4. GCS verbal subscore is 5. GCS motor subscore is 6.   Skin: Skin is warm and dry. Capillary refill takes less than 2 seconds.         ED Course   Procedures  Labs Reviewed - No data to display       Imaging Results    None          Medications - No data to display  Medical Decision Making  A 59 y.o. female patient with a history of Crohn's  disease, DM, HLD presents to the ED with area of redness, induration, and pain to just between her eyebrows, more towards the left. The onset is worsening for past 4 days after being stung by a wasp. She states her brother is a doctor and prescribed her bactrim. Upon chart review, she was prescribed bactrim 400/80 mg BID. She has been on this for 2 doses and states it is not improving and her brother told her to come here for IV antibiotics. Patient is afebrile and not tachycardic.     Patient to take antibiotics for a full 72 hours along with warm compresses, then return to ED if symptoms do not improve. Patient to return to ED if symptoms worsen, becomes febrile, has vision changes or eyelid involvement, or redness/infection area becomes larger.    Clinical impression:  Bite or sting by insect with infection (Primary)    Patient is non-toxic appearing and tolerating nutritional intake. Patient's vital signs and clinical condition are stable for DC with ED Prescriptions     Medication Sig Dispense Start Date End Date Auth. Provider    sulfamethoxazole-trimethoprim 800-160mg (BACTRIM DS) 800-160 mg Tab Take   1 tablet by mouth 2 (two) times daily. for 7 days 14 tablet 7/19/2025 7/26/2025 Iram Koroma PA    ibuprofen (ADVIL,MOTRIN) 800 MG tablet Take 1 tablet (800 mg total) by   mouth every 6 (six) hours as needed for Pain. 20 tablet 7/19/2025 --   Iram Koroma PA         Follow-up: PCP or Internal medicine clinic within 3 days  Referrals made: none    Strict follow-up precautions given. Patient verbalizes understanding of treatment plan and ED return precautions.       Amount and/or Complexity of Data Reviewed  Discussion of management or test interpretation with external provider(s): Spoke with Dr. Trujillo regarding patient's condition. They recommend and agree with the above plan of care.       Risk  Prescription drug management.  Risk Details: Given strict ED return precautions. I have spoken with  the patient and/or caregivers. I have explained the patient's condition, diagnoses and treatment plan based on the information available to me at this time. I have answered the patient's and/or caregiver's questions and addressed any concerns. The patient and/or caregivers have as good an understanding of the patient's diagnosis, condition and treatment plan as can be expected at this point. The patient's condition is stable and appropriate for discharge from the emergency department.      The patient will pursue further outpatient evaluation with the primary care physician or other designated or consulting physician as outlined in the discharge instructions. The patient and/or caregivers are agreeable to this plan of care and follow-up instructions have been explained in detail. The patient and/or caregivers have received these instructions in written format and have expressed an understanding of the discharge instructions. The patient and/or caregivers are aware that any significant change in condition or worsening of symptoms should prompt an immediate return to this or the closest emergency department or a call to 911.               Additional MDM:   Differential Diagnosis:   Other: The following diagnoses were also considered and will be evaluated: dermatitis, abscess and cellulitis.                                       Clinical Impression:  Final diagnoses:  [W57.XXXA] Bite or sting by insect with infection (Primary)          ED Disposition Condition    Discharge Stable          ED Prescriptions       Medication Sig Dispense Start Date End Date Auth. Provider    sulfamethoxazole-trimethoprim 800-160mg (BACTRIM DS) 800-160 mg Tab Take 1 tablet by mouth 2 (two) times daily. for 7 days 14 tablet 7/19/2025 7/26/2025 Iram Koroma PA    ibuprofen (ADVIL,MOTRIN) 800 MG tablet Take 1 tablet (800 mg total) by mouth every 6 (six) hours as needed for Pain. 20 tablet 7/19/2025 -- Iram Koroma PA           Follow-up Information       Follow up With Specialties Details Why Contact Info    [facility] - Emergency Dept Emergency Medicine Go to  If symptoms worsen, As needed [address] [facility] [address] [phone]    Sharita Bridges, [facility] Family Medicine In 3 days  [address] [address]  [phone]                     [1]   Social History  Tobacco Use    Smoking status: Every Day     Current packs/day: 1.00     Average packs/day: 1 pack/day for 10.5 years (10.5 ttl pk-yrs)     Types: Cigarettes     Start date: 1/1/2015    Smokeless tobacco: Never   Vaping Use    Vaping status: Never Used   Substance Use Topics    Alcohol use: Yes     Comment: ocassionally    Drug use: Never        Iram Koroma PA  07/19/25 2327

## 2025-07-20 NOTE — DISCHARGE INSTRUCTIONS
Take bactrim 800-160 mg twice a day for 7 days. Do warm compresses four times a day. Return to ED if symptoms worsen or do not resolve.     It is important that you follow up with your primary care provider or specialist if indicated for further evaluation, workup, and treatment as necessary. The exam and treatment you received in Emergency Department was for an urgent problem and NOT INTENDED AS COMPLETE CARE. It is important that you FOLLOW UP with a doctor for ongoing care. If your symptoms become WORSE or you DO NOT IMPROVE and you are unable to reach your health care provider, you should RETURN to the Emergency Department. The Emergency Department provider has provided a PRELIMINARY INTERPRETATION of all your studies. A final interpretation may be done after you are discharged. If a change in your diagnosis or treatment is needed WE WILL CONTACT YOU. It is critical that we have a CURRENT PHONE NUMBER FOR YOU.

## 2025-07-21 NOTE — ED PROVIDER NOTES
Encounter Date: 2025       History     Chief Complaint   Patient presents with    re-evaluation on wasp sting to left eye     Patient reports to the ed secondary to re-evaluation of wasp sting to left eye on last week. Was seen here on last night for the same complaint. Redness with swelling noted. Rates pain 7/10 at this time. Sharonda WAGNER 59 y.o. female patient with a history of Crohn's disease, DM, HLD presents to the ED with area of redness, induration, and pain to just between her eyebrows, more towards the left. The onset is worsening for past 4 days after being stung by a wasp. She states her brother is a doctor and prescribed her bactrim. She has been on this for 4 doses and states it is not improving and her brother told her to come here for IV antibiotics. Patient is afebrile and HR 96 upon arrival. Patient was seen here yesterday and told to continue bactrim and warm compresses. She is here today because it has started draining and is more swollen/painful.       The history is provided by the patient.     Review of patient's allergies indicates:   Allergen Reactions    Iodine Swelling, Itching and Shortness Of Breath     Other reaction(s): Swelling of throat    Latex Hives, Itching and Swelling    Shellfish containing products Swelling     Other reaction(s): Swelling of throat    Codeine Hives and Itching     Past Medical History:   Diagnosis Date    Crohn's disease     Diabetes mellitus     HLD (hyperlipidemia)      Past Surgical History:   Procedure Laterality Date    ANKLE FRACTURE SURGERY Right      SECTION      CHOLECYSTECTOMY      colon rescetion  10/14/2013    COLONOSCOPY N/A 2024    Procedure: COLONOSCOPY;  Surgeon: Izzy Rooney MD;  Location: [facility] ENDOSCOPY;  Service: Gastroenterology;  Laterality: N/A;    TONSILLECTOMY, ADENOIDECTOMY       Family History   Problem Relation Name Age of Onset    Hypertension Father      Diabetes Father      Diabetes Mother       Thyroid cancer Mother      Diabetes Brother      Diabetes Sister       Social History[1]  Review of Systems   Constitutional:  Negative for chills and fever.   Eyes:  Negative for pain and visual disturbance.   Respiratory:  Negative for shortness of breath.    Cardiovascular:  Negative for chest pain.   Gastrointestinal:  Negative for nausea and vomiting.   Genitourinary:  Negative for difficulty urinating and dysuria.   Musculoskeletal:  Negative for arthralgias.   Skin:  Positive for rash. Negative for color change.   Neurological:  Negative for weakness and headaches.   Hematological:  Does not bruise/bleed easily.   Psychiatric/Behavioral:  Negative for confusion.    All other systems reviewed and are negative.      Physical Exam     Initial Vitals [07/20/25 1651]   BP Pulse Resp Temp SpO2   128/81 96 18 97.9 °F (36.6 °C) 99 %      MAP       --         Physical Exam    Nursing note and vitals reviewed.  Constitutional: She appears well-developed and well-nourished.   HENT:   Head: Normocephalic and atraumatic.       Right Ear: External ear normal.   Left Ear: External ear normal.   Nose: Nose normal. Mouth/Throat: Oropharynx is clear and moist.   Area of warmth, erythema, and induration to forehead as graphed, approximately 1x2 cm. TTP. Scant, yellow drainage noted at this time.   Redness and swelling extends across left eyebrow and eyelid. EOM intact. No visual disturbance. No ptosis or chemosis.    Eyes: Conjunctivae and EOM are normal.   Neck: Neck supple.   Cardiovascular:  Normal rate, regular rhythm and normal heart sounds.     Exam reveals no gallop and no friction rub.       No murmur heard.  Pulmonary/Chest: Breath sounds normal. No respiratory distress. She has no wheezes. She has no rhonchi. She has no rales.   Musculoskeletal:      Cervical back: Neck supple.     Neurological: She is alert and oriented to person, place, and time. GCS score is 15. GCS eye subscore is 4. GCS verbal subscore is 5. GCS  motor subscore is 6.   Skin: Skin is warm and dry. Capillary refill takes less than 2 seconds.         ED Course   I & D - Incision and Drainage    Date/Time: 7/20/2025 8:37 PM  Location procedure was performed: [facility] EMERGENCY DEPARTMENT    Performed by: Iram Koroma PA  Authorized by: Marcelino Bolaños MD  Consent Done: Yes  Consent: Verbal consent obtained  Risks and benefits: risks, benefits and alternatives were discussed  Consent given by: patient  Patient understanding: patient states understanding of the procedure being performed  Patient identity confirmed: verbally with patient  Type: abscess  Body area: head/neck  Location details: face  Anesthesia: local infiltration    Anesthesia:  Local Anesthetic: topical anesthetic    Patient sedated: no  Scalpel size: 18 gauge needle.  Incision type: single straight  Incision depth: subcutaneous  Complexity: simple  Drainage: pus and serosanguinous  Drainage amount: moderate  Wound treatment: wound left open, incision, drainage and expression of material  Packing material: none  Complications: No  Estimated blood loss (mL): 1  Specimens: No  Implants: No  Patient tolerance: Patient tolerated the procedure well with no immediate complications    Incision depth: subcutaneous        Labs Reviewed   COMPREHENSIVE METABOLIC PANEL - Abnormal       Result Value    Sodium 137      Potassium 4.9      Chloride 104      CO2 21 (*)     Glucose 248 (*)     Blood Urea Nitrogen 15.0      Creatinine 1.12 (*)     Calcium 10.2      Protein Total 8.9 (*)     Albumin 4.2      Globulin 4.7 (*)     Albumin/Globulin Ratio 0.9 (*)     Bilirubin Total 0.5      ALP 82      ALT 17      AST 17      eGFR 56      Anion Gap 12.0      BUN/Creatinine Ratio 13     CBC WITH DIFFERENTIAL - Abnormal    WBC 12.97 (*)     RBC 4.98      Hgb 15.4      Hct 46.1      MCV 92.6      MCH 30.9      MCHC 33.4      RDW 14.1      Platelet 316      MPV 9.7      Neut % 69.0      Lymph % 22.1       Mono % 5.0      Eos % 3.1      Basophil % 0.5      Imm Grans % 0.3      Neut # 8.95      Lymph # 2.86      Mono # 0.65      Eos # 0.40      Baso # 0.07      Imm Gran # 0.04      NRBC% 0.0     URINALYSIS, REFLEX TO URINE CULTURE - Abnormal    Color, UA Yellow      Appearance, UA Clear      Specific Gravity, UA 1.031 (*)     pH, UA 5.5      Protein, UA Trace (*)     Glucose, UA 4+ (*)     Ketones, UA Negative      Blood, UA Negative      Bilirubin, UA Negative      Urobilinogen, UA Normal      Nitrites, UA Negative      Leukocyte Esterase, UA 25 (*)     RBC, UA 0-5      WBC, UA 0-5      Bacteria, UA Trace (*)     Squamous Epithelial Cells, UA Trace (*)     Mucous, UA Trace (*)     Hyaline Casts, UA None Seen     LACTIC ACID, PLASMA - Normal    Lactic Acid Level 2.0     BLOOD CULTURE [facility]   BLOOD CULTURE [facility]   CBC W/ AUTO DIFFERENTIAL    Narrative:     The following orders were created for panel order CBC Auto Differential.  Procedure                               Abnormality         Status                     ---------                               -----------         ------                     CBC with Differential[[id number]]       Abnormal            Final result                 Please view results for these tests on the individual orders.   EXTRA TUBES    Narrative:     The following orders were created for panel order EXTRA TUBES.  Procedure                               Abnormality         Status                     ---------                               -----------         ------                     Light Blue Top Hold[[id number]]                             Final result               Red Top Hold[[id number]]                                    Final result               Gold Top Hold[[id number]]                                   Final result                 Please view results for these tests on the individual orders.   LIGHT BLUE TOP HOLD    Extra Tube Hold for add-ons.     RED TOP HOLD    Extra Tube Hold for  add-ons.     GOLD TOP HOLD    Extra Tube Hold for add-ons.            Imaging Results    None          Medications   clindamycin in D5W 600 mg/50 mL IVPB 600 mg (0 mg Intravenous Stopped 7/20/25 2014)   LIDOcaine-prilocaine cream ( Topical (Top) Given 7/20/25 1930)   ketorolac injection 15 mg (15 mg Intravenous Given 7/20/25 1930)   lactated ringers bolus 1,000 mL (0 mLs Intravenous Stopped 7/20/25 2034)     Medical Decision Making  A 59 y.o. female patient with a history of Crohn's disease, DM, HLD presents to the ED with area of redness, induration, and pain to just between her eyebrows, more towards the left. The onset is worsening for past 4 days after being stung by a wasp. She states her brother is a doctor and prescribed her bactrim. She has been on this for 4 doses and states it is not improving and her brother told her to come here for IV antibiotics. Patient is afebrile and HR 96 upon arrival. Patient was seen here yesterday and told to continue bactrim and warm compresses. She is here today because it has started draining and is more swollen/painful.     Facial abscess drained with 18 g needle successfully.   Patient's WBC mildly elevated and HR of 96 upon arrival, lactic and blood cultures drawn.   IV clindamycin given.   HR after getting in a bed and on monitor, 70-80.  Lactic acid 2.0.  I&D performed  Spoke with Dr. Trujillo regarding patient's condition. They recommend and agree with the above plan of care.       Patient's condition improved with the following Medications  clindamycin in D5W 600 mg/50 mL IVPB 600 mg (0 mg Intravenous Stopped 7/20/25 2014)  LIDOcaine-prilocaine cream ( Topical (Top) Given 7/20/25 1930)  ketorolac injection 15 mg (15 mg Intravenous Given 7/20/25 1930)  lactated ringers bolus 1,000 mL (0 mLs Intravenous Stopped 7/20/25 2034)    Clinical impression:  Facial abscess (Primary)    Patient is non-toxic appearing and tolerating nutritional intake. Patient's vital signs and  clinical condition are stable for DC with ED Prescriptions    None -- patient to continue bactrim BID as prescribed for 7 more days        Follow-up: PCP or Internal medicine clinic within 3 days  Referrals made: none    Strict follow-up precautions given. Patient verbalizes understanding of treatment plan and ED return precautions.     Given strict ED return precautions. I have spoken with the patient and/or caregivers. I have explained the patient's condition, diagnoses and treatment plan based on the information available to me at this time. I have answered the patient's and/or caregiver's questions and addressed any concerns. The patient and/or caregivers have as good an understanding of the patient's diagnosis, condition and treatment plan as can be expected at this point. The patient's condition is stable and appropriate for discharge from the emergency department.      The patient will pursue further outpatient evaluation with the primary care physician or other designated or consulting physician as outlined in the discharge instructions. The patient and/or caregivers are agreeable to this plan of care and follow-up instructions have been explained in detail. The patient and/or caregivers have received these instructions in written format and have expressed an understanding of the discharge instructions. The patient and/or caregivers are aware that any significant change in condition or worsening of symptoms should prompt an immediate return to this or the closest emergency department or a call to 911.                 Additional MDM:   Differential Diagnosis:   Other: The following diagnoses were also considered and will be evaluated: dermatitis, abscess and cellulitis.            ED Course as of 07/20/25 2133   Sun Jul 20, 2025   1716 Pulse: 96 [AG]   1807 WBC(!): 12.97 [AG]   1807 Hemoglobin: 15.4 [AG]   1826 eGFR: 56 [AG]   1826 Creatinine(!): 1.12 [AG]   1826 Glucose(!): 248 [AG]   1852 Lactic Acid Level:  2.0 [AG]   2033 Bacteria, UA(!): Trace [AG]   2033 Leukocyte Esterase, UA(!): 25 [AG]      ED Course User Index  [AG] Iram Koroma PA                               Clinical Impression:  Final diagnoses:  [L02.01] Facial abscess (Primary)          ED Disposition Condition    Discharge Stable            ED Prescriptions    None       Follow-up Information       Follow up With Specialties Details Why Contact Info    [facility] - Emergency Dept Emergency Medicine Go to  If symptoms worsen, As needed [address] [facility] [id number] [phone]    Sharita Bridges, [facility] In 3 days  [address] [facility] [address]  [phone]                    Iram Koroma PA  07/19/25 2322       Iram Koroma PA  07/20/25 2040         [1]   Social History  Tobacco Use    Smoking status: Every Day     Current packs/day: 1.00     Average packs/day: 1 pack/day for 10.5 years (10.5 ttl pk-yrs)     Types: Cigarettes     Start date: 1/1/2015    Smokeless tobacco: Never   Vaping Use    Vaping status: Never Used   Substance Use Topics    Alcohol use: Yes     Comment: ocassionally    Drug use: Never        Iram Koroma PA  07/20/25 8589

## 2025-07-22 LAB
OHS QRS DURATION: 80 MS
OHS QTC CALCULATION: 437 MS

## 2025-07-24 ENCOUNTER — PATIENT MESSAGE (OUTPATIENT)
Facility: CLINIC | Age: 60
End: 2025-07-24

## 2025-07-25 LAB
BACTERIA BLD CULT: NORMAL
BACTERIA BLD CULT: NORMAL

## 2025-08-20 DIAGNOSIS — E11.65 TYPE 2 DIABETES MELLITUS WITH HYPERGLYCEMIA, WITHOUT LONG-TERM CURRENT USE OF INSULIN: Chronic | ICD-10-CM

## 2025-08-20 DIAGNOSIS — E11.9 TYPE 2 DIABETES MELLITUS WITHOUT COMPLICATION, WITHOUT LONG-TERM CURRENT USE OF INSULIN: ICD-10-CM

## 2025-08-23 RX ORDER — EZETIMIBE 10 MG/1
10 TABLET ORAL DAILY
Qty: 90 TABLET | Refills: 1 | Status: SHIPPED | OUTPATIENT
Start: 2025-08-23

## 2025-08-23 RX ORDER — CHOLECALCIFEROL (VITAMIN D3) 125 MCG
1 CAPSULE ORAL DAILY
OUTPATIENT
Start: 2025-08-23

## 2025-08-23 RX ORDER — IBUPROFEN 800 MG/1
800 TABLET, FILM COATED ORAL EVERY 6 HOURS PRN
Qty: 20 TABLET | Refills: 0 | OUTPATIENT
Start: 2025-08-23

## 2025-08-23 RX ORDER — INSULIN GLARGINE 100 [IU]/ML
20 INJECTION, SOLUTION SUBCUTANEOUS NIGHTLY
Qty: 18 ML | Refills: 3 | Status: SHIPPED | OUTPATIENT
Start: 2025-08-23 | End: 2026-08-23

## 2025-08-23 RX ORDER — INSULIN LISPRO 100 [IU]/ML
3 INJECTION, SOLUTION INTRAVENOUS; SUBCUTANEOUS
Qty: 2.7 ML | Refills: 3 | Status: SHIPPED | OUTPATIENT
Start: 2025-08-23 | End: 2026-08-23

## 2025-08-23 RX ORDER — LORATADINE 10 MG/1
10 TABLET ORAL DAILY
Qty: 30 TABLET | Refills: 0 | OUTPATIENT
Start: 2025-08-23 | End: 2025-09-22

## (undated) DEVICE — TIP SUCTION YANKAUER

## (undated) DEVICE — MANIFOLD 4 PORT

## (undated) DEVICE — KIT SURGICAL COLON .25 1.1OZ

## (undated) DEVICE — SOL IRRI STRL WATER 1000ML